# Patient Record
Sex: MALE | Race: BLACK OR AFRICAN AMERICAN | Employment: FULL TIME | URBAN - METROPOLITAN AREA
[De-identification: names, ages, dates, MRNs, and addresses within clinical notes are randomized per-mention and may not be internally consistent; named-entity substitution may affect disease eponyms.]

---

## 2019-04-27 ENCOUNTER — ED HISTORICAL/CONVERTED ENCOUNTER (OUTPATIENT)
Dept: OTHER | Age: 24
End: 2019-04-27

## 2019-05-14 ENCOUNTER — ED HISTORICAL/CONVERTED ENCOUNTER (OUTPATIENT)
Dept: OTHER | Age: 24
End: 2019-05-14

## 2019-11-03 ENCOUNTER — ED HISTORICAL/CONVERTED ENCOUNTER (OUTPATIENT)
Dept: OTHER | Age: 24
End: 2019-11-03

## 2019-12-03 ENCOUNTER — ED HISTORICAL/CONVERTED ENCOUNTER (OUTPATIENT)
Dept: OTHER | Age: 24
End: 2019-12-03

## 2020-03-20 ENCOUNTER — ED HISTORICAL/CONVERTED ENCOUNTER (OUTPATIENT)
Dept: OTHER | Age: 25
End: 2020-03-20

## 2020-04-03 ENCOUNTER — ED HISTORICAL/CONVERTED ENCOUNTER (OUTPATIENT)
Dept: OTHER | Age: 25
End: 2020-04-03

## 2020-05-21 ENCOUNTER — ED HISTORICAL/CONVERTED ENCOUNTER (OUTPATIENT)
Dept: OTHER | Age: 25
End: 2020-05-21

## 2020-07-07 ENCOUNTER — ED HISTORICAL/CONVERTED ENCOUNTER (OUTPATIENT)
Dept: OTHER | Age: 25
End: 2020-07-07

## 2020-07-31 ENCOUNTER — ED HISTORICAL/CONVERTED ENCOUNTER (OUTPATIENT)
Dept: OTHER | Age: 25
End: 2020-07-31

## 2020-12-31 ENCOUNTER — APPOINTMENT (OUTPATIENT)
Dept: GENERAL RADIOLOGY | Age: 25
End: 2020-12-31
Attending: EMERGENCY MEDICINE
Payer: MEDICAID

## 2020-12-31 ENCOUNTER — APPOINTMENT (OUTPATIENT)
Dept: CT IMAGING | Age: 25
End: 2020-12-31
Attending: EMERGENCY MEDICINE
Payer: MEDICAID

## 2020-12-31 ENCOUNTER — HOSPITAL ENCOUNTER (EMERGENCY)
Age: 25
Discharge: HOME | End: 2020-12-31
Payer: MEDICAID

## 2020-12-31 VITALS
RESPIRATION RATE: 16 BRPM | OXYGEN SATURATION: 99 % | SYSTOLIC BLOOD PRESSURE: 118 MMHG | DIASTOLIC BLOOD PRESSURE: 57 MMHG | TEMPERATURE: 97.2 F | WEIGHT: 196 LBS | HEIGHT: 62 IN | BODY MASS INDEX: 36.07 KG/M2 | HEART RATE: 82 BPM

## 2020-12-31 DIAGNOSIS — R55 VASOVAGAL EPISODE: Primary | ICD-10-CM

## 2020-12-31 LAB
ALBUMIN SERPL-MCNC: 3.2 G/DL (ref 3.5–5)
ALBUMIN/GLOB SERPL: 0.7 {RATIO} (ref 1.1–2.2)
ALP SERPL-CCNC: 83 U/L (ref 45–117)
ALT SERPL-CCNC: 40 U/L (ref 12–78)
AMMONIA PLAS-SCNC: 31 UMOL/L
AMPHET UR QL SCN: NEGATIVE
ANION GAP SERPL CALC-SCNC: 5 MMOL/L (ref 5–15)
APPEARANCE UR: CLEAR
AST SERPL W P-5'-P-CCNC: 24 U/L (ref 15–37)
ATRIAL RATE: 89 BPM
BACTERIA URNS QL MICRO: NEGATIVE /HPF
BARBITURATES UR QL SCN: NEGATIVE
BASOPHILS # BLD: 0.1 K/UL (ref 0–0.1)
BASOPHILS NFR BLD: 1 % (ref 0–1)
BENZODIAZ UR QL: NEGATIVE
BILIRUB SERPL-MCNC: 0.3 MG/DL (ref 0.2–1)
BILIRUB UR QL: NEGATIVE
BUN SERPL-MCNC: 12 MG/DL (ref 6–20)
BUN/CREAT SERPL: 15 (ref 12–20)
CA-I BLD-MCNC: 8.4 MG/DL (ref 8.5–10.1)
CALCULATED P AXIS, ECG09: 46 DEGREES
CALCULATED R AXIS, ECG10: 46 DEGREES
CALCULATED T AXIS, ECG11: 33 DEGREES
CANNABINOIDS UR QL SCN: NEGATIVE
CHLORIDE SERPL-SCNC: 107 MMOL/L (ref 97–108)
CO2 SERPL-SCNC: 26 MMOL/L (ref 21–32)
COCAINE UR QL SCN: NEGATIVE
COLOR UR: NORMAL
CREAT SERPL-MCNC: 0.8 MG/DL (ref 0.7–1.3)
DIAGNOSIS, 93000: NORMAL
DIFFERENTIAL METHOD BLD: ABNORMAL
DRUG SCRN COMMENT,DRGCM: NORMAL
EOSINOPHIL # BLD: 0.2 K/UL (ref 0–0.4)
EOSINOPHIL NFR BLD: 3 % (ref 0–7)
ERYTHROCYTE [DISTWIDTH] IN BLOOD BY AUTOMATED COUNT: 14.7 % (ref 11.5–14.5)
GLOBULIN SER CALC-MCNC: 4.5 G/DL (ref 2–4)
GLUCOSE SERPL-MCNC: 106 MG/DL (ref 65–100)
GLUCOSE UR STRIP.AUTO-MCNC: NEGATIVE MG/DL
HCT VFR BLD AUTO: 38.5 % (ref 36.6–50.3)
HGB BLD-MCNC: 12.5 G/DL (ref 12.1–17)
HGB UR QL STRIP: NEGATIVE
IMM GRANULOCYTES # BLD AUTO: 0 K/UL (ref 0–0.04)
IMM GRANULOCYTES NFR BLD AUTO: 0 % (ref 0–0.5)
KETONES UR QL STRIP.AUTO: NEGATIVE MG/DL
LEUKOCYTE ESTERASE UR QL STRIP.AUTO: NEGATIVE
LIPASE SERPL-CCNC: 92 U/L (ref 73–393)
LYMPHOCYTES # BLD: 2.2 K/UL (ref 0.8–3.5)
LYMPHOCYTES NFR BLD: 35 % (ref 12–49)
MCH RBC QN AUTO: 27.1 PG (ref 26–34)
MCHC RBC AUTO-ENTMCNC: 32.5 G/DL (ref 30–36.5)
MCV RBC AUTO: 83.3 FL (ref 80–99)
METHADONE UR QL: NEGATIVE
MONOCYTES # BLD: 0.5 K/UL (ref 0–1)
MONOCYTES NFR BLD: 8 % (ref 5–13)
MUCOUS THREADS URNS QL MICRO: NORMAL /LPF
NEUTS SEG # BLD: 3.4 K/UL (ref 1.8–8)
NEUTS SEG NFR BLD: 53 % (ref 32–75)
NITRITE UR QL STRIP.AUTO: NEGATIVE
OPIATES UR QL: NEGATIVE
P-R INTERVAL, ECG05: 192 MS
PCP UR QL: NEGATIVE
PH UR STRIP: 7 [PH] (ref 5–8)
PLATELET # BLD AUTO: 333 K/UL (ref 150–400)
PMV BLD AUTO: 9.1 FL (ref 8.9–12.9)
POTASSIUM SERPL-SCNC: 3.2 MMOL/L (ref 3.5–5.1)
PROT SERPL-MCNC: 7.7 G/DL (ref 6.4–8.2)
PROT UR STRIP-MCNC: NEGATIVE MG/DL
Q-T INTERVAL, ECG07: 342 MS
QRS DURATION, ECG06: 86 MS
QTC CALCULATION (BEZET), ECG08: 416 MS
RBC # BLD AUTO: 4.62 M/UL (ref 4.1–5.7)
RBC #/AREA URNS HPF: NORMAL /HPF (ref 0–5)
SODIUM SERPL-SCNC: 138 MMOL/L (ref 136–145)
SP GR UR REFRACTOMETRY: 1.01 (ref 1–1.03)
UA: UC IF INDICATED,UAUC: NORMAL
UROBILINOGEN UR QL STRIP.AUTO: 0.1 EU/DL (ref 0.1–1)
VENTRICULAR RATE, ECG03: 89 BPM
WBC # BLD AUTO: 6.4 K/UL (ref 4.1–11.1)
WBC URNS QL MICRO: NORMAL /HPF (ref 0–4)

## 2020-12-31 PROCEDURE — 74018 RADEX ABDOMEN 1 VIEW: CPT

## 2020-12-31 PROCEDURE — 36415 COLL VENOUS BLD VENIPUNCTURE: CPT

## 2020-12-31 PROCEDURE — 99285 EMERGENCY DEPT VISIT HI MDM: CPT

## 2020-12-31 PROCEDURE — 80307 DRUG TEST PRSMV CHEM ANLYZR: CPT

## 2020-12-31 PROCEDURE — 70450 CT HEAD/BRAIN W/O DYE: CPT

## 2020-12-31 PROCEDURE — 85025 COMPLETE CBC W/AUTO DIFF WBC: CPT

## 2020-12-31 PROCEDURE — 80053 COMPREHEN METABOLIC PANEL: CPT

## 2020-12-31 PROCEDURE — 81001 URINALYSIS AUTO W/SCOPE: CPT

## 2020-12-31 PROCEDURE — 83690 ASSAY OF LIPASE: CPT

## 2020-12-31 PROCEDURE — 82140 ASSAY OF AMMONIA: CPT

## 2020-12-31 PROCEDURE — 93005 ELECTROCARDIOGRAM TRACING: CPT

## 2020-12-31 NOTE — ED PROVIDER NOTES
EMERGENCY DEPARTMENT HISTORY AND PHYSICAL EXAM      Date: 12/31/2020  Patient Name: Torri Peng    History of Presenting Illness     Chief Complaint   Patient presents with    Abdominal Pain    Headache       History Provided By: Patient    HPI: Torri Peng, 26 y.o. male with a past medical history significant No significant past medical history presents to the ED with cc of abdominal pain. Patient was at work when he experienced sudden abdominal pain described as cramping in nature. Patient then sat down on the ground. A rapid response was called and patient was brought to the ER for evaluation. Patient specifically denies fever chills, chest pain, shortness of breath, nausea, vomiting, diarrhea. Moderate severity, no known exacerbating or relieving factors, no other associated signs and symptoms    There are no other complaints, changes, or physical findings at this time. PCP: Chaitanya Mancilla MD    No current facility-administered medications on file prior to encounter. No current outpatient medications on file prior to encounter. Past History     Past Medical History:  History reviewed. No pertinent past medical history. Past Surgical History:  History reviewed. No pertinent surgical history. Family History:  History reviewed. No pertinent family history. Social History:  Social History     Tobacco Use    Smoking status: Never Smoker    Smokeless tobacco: Never Used   Substance Use Topics    Alcohol use: Never     Frequency: Never    Drug use: Never       Allergies:  No Known Allergies      Review of Systems     Review of Systems   Constitutional: Negative for chills and fever. HENT: Negative for dental problem and sore throat. Eyes: Negative for pain and visual disturbance. Respiratory: Negative for cough and chest tightness. Cardiovascular: Negative for chest pain. Gastrointestinal: Positive for abdominal pain. Negative for diarrhea and nausea.    Genitourinary: Negative for difficulty urinating and frequency. Musculoskeletal: Negative for gait problem and joint swelling. Neurological: Positive for headaches. Negative for numbness. Hematological: Negative for adenopathy. Does not bruise/bleed easily. Psychiatric/Behavioral: Negative for behavioral problems and suicidal ideas. Physical Exam     Physical Exam  Constitutional:       General: He is not in acute distress. Appearance: Normal appearance. He is not ill-appearing or toxic-appearing. HENT:      Head: Normocephalic and atraumatic. Nose: Nose normal.      Mouth/Throat:      Mouth: Mucous membranes are moist.   Eyes:      Extraocular Movements: Extraocular movements intact. Pupils: Pupils are equal, round, and reactive to light. Neck:      Musculoskeletal: Normal range of motion and neck supple. Cardiovascular:      Rate and Rhythm: Normal rate and regular rhythm. Pulmonary:      Effort: Pulmonary effort is normal.      Breath sounds: Normal breath sounds. Abdominal:      General: Bowel sounds are normal.   Musculoskeletal: Normal range of motion. Skin:     General: Skin is warm and dry. Capillary Refill: Capillary refill takes less than 2 seconds. Neurological:      General: No focal deficit present. Mental Status: He is alert and oriented to person, place, and time. Psychiatric:         Mood and Affect: Mood normal.         Behavior: Behavior normal.         Lab and Diagnostic Study Results     Labs -     No results found for this or any previous visit (from the past 12 hour(s)). Radiologic Studies -   @lastxrresult@  CT Results  (Last 48 hours)    None        CXR Results  (Last 48 hours)    None            Medical Decision Making   - I am the first provider for this patient. - I reviewed the vital signs, available nursing notes, past medical history, past surgical history, family history and social history. - Initial assessment performed.  The patients presenting problems have been discussed, and they are in agreement with the care plan formulated and outlined with them. I have encouraged them to ask questions as they arise throughout their visit. Vital Signs-Reviewed the patient's vital signs. No data found. Records Reviewed: Nursing Notes and Old Medical Records          ED Course:          Provider Notes (Medical Decision Making):   Pt presents with acute abdominal pain; vital signs stable with currently a non-peritoneal exam; DDx includes: Gastroenteritis, hepatitis, pancreatitis, obstruction, appendicitis, viral illness, IBD, diverticulitis, mesenteric ischemia, AAA or descending dissection, ACS, kidney stone. Will get labs, treat symptomatically and obtain serial abdominal exams to determine if additional imaging is indicated. Will reassess and monitor closely. MDM       Procedures   Medical Decision Makingedical Decision Making  Performed by: Braulio Hardy NP  PROCEDURES:  Procedures       Disposition   Disposition: DC- Adult Discharges: All of the diagnostic tests were reviewed and questions answered. Diagnosis, care plan and treatment options were discussed. The patient understands the instructions and will follow up as directed. The patients results have been reviewed with them. They have been counseled regarding their diagnosis. The patient verbally convey understanding and agreement of the signs, symptoms, diagnosis, treatment and prognosis and additionally agrees to follow up as recommended with their PCP in 24 - 48 hours. They also agree with the care-plan and convey that all of their questions have been answered.   I have also put together some discharge instructions for them that include: 1) educational information regarding their diagnosis, 2) how to care for their diagnosis at home, as well a 3) list of reasons why they would want to return to the ED prior to their follow-up appointment, should their condition change. Discharged    DISCHARGE PLAN:  1. There are no discharge medications for this patient. 2.   Follow-up Information     Follow up With Specialties Details Why Contact Info    Dimitry Deleon MD Internal Medicine   [facility] [address]  [facility]  [phone]          3. Return to ED if worse   4. There are no discharge medications for this patient. Diagnosis     Clinical Impression:   1. Vasovagal episode        Attestations:    Yesenia Mariano NP    Please note that this dictation was completed with [employer], the computer voice recognition software. Quite often unanticipated grammatical, syntax, homophones, and other interpretive errors are inadvertently transcribed by the computer software. Please disregard these errors. Please excuse any errors that have escaped final proofreading. Thank you.

## 2020-12-31 NOTE — ED TRIAGE NOTES
Rapid response called to dietary. Pt came to work complaining of a HA and tooth ache. Pt then started complaining LLQ pain and laid on the floor in dietary.

## 2020-12-31 NOTE — LETTER
[address] [place] [address] EMERGENCY DEPT 
[facility] [address] [place] [address] 
[phone] Work/School Note Date: 12/31/2020 To Whom It May concern: 
 
Ana Jaimes was seen and treated today in the emergency room by the following provider(s): 
Nurse Practitioner: Crystal Petty NP. Ana Jaimes may return to work on 1/2/2021.  
 
Sincerely, 
 
 
 
 
Neville Galeazzi RN/ Camelia LARRY

## 2020-12-31 NOTE — LETTER
[address] [place] [address] EMERGENCY DEPT 
[facility] [address] [place] [address] 
[phone] Work/School Note Date: 12/31/2020 To Whom It May concern: 
 
Macario Saldana was seen and treated today in the emergency room by the following provider(s): 
Nurse Practitioner: Deejay Murray NP. Macario Saldana is excused from work/school on 12/31/20 and 01/01/21. He is medically clear to return to work/school on 1/2/2021. Sincerely, 
 
 
 
 
Marylene Pinks

## 2020-12-31 NOTE — ED NOTES
[facility] all follow up appointments explained with pt's verbal understanding given. All personal belongings taken.

## 2021-02-01 ENCOUNTER — HOSPITAL ENCOUNTER (EMERGENCY)
Age: 26
Discharge: HOME | End: 2021-02-01
Payer: MEDICAID

## 2021-02-01 VITALS
BODY MASS INDEX: 32.76 KG/M2 | RESPIRATION RATE: 16 BRPM | HEIGHT: 62 IN | WEIGHT: 178 LBS | OXYGEN SATURATION: 98 % | TEMPERATURE: 98.1 F | HEART RATE: 84 BPM | SYSTOLIC BLOOD PRESSURE: 100 MMHG | DIASTOLIC BLOOD PRESSURE: 62 MMHG

## 2021-02-01 DIAGNOSIS — L02.412 ABSCESS OF LEFT AXILLA: ICD-10-CM

## 2021-02-01 DIAGNOSIS — L73.2 AXILLARY HIDRADENITIS SUPPURATIVA: Primary | ICD-10-CM

## 2021-02-01 PROCEDURE — 99282 EMERGENCY DEPT VISIT SF MDM: CPT

## 2021-02-01 PROCEDURE — 74011000250 HC RX REV CODE- 250: Performed by: PHYSICIAN ASSISTANT

## 2021-02-01 PROCEDURE — 75810000289 HC I&D ABSCESS SIMP/COMP/MULT

## 2021-02-01 RX ORDER — LIDOCAINE HYDROCHLORIDE 10 MG/ML
20 INJECTION INFILTRATION; PERINEURAL ONCE
Status: COMPLETED | OUTPATIENT
Start: 2021-02-01 | End: 2021-02-01

## 2021-02-01 RX ORDER — CEPHALEXIN 500 MG/1
500 CAPSULE ORAL 3 TIMES DAILY
Qty: 21 CAP | Refills: 0 | Status: SHIPPED | OUTPATIENT
Start: 2021-02-01 | End: 2021-02-08

## 2021-02-01 RX ORDER — SULFAMETHOXAZOLE AND TRIMETHOPRIM 800; 160 MG/1; MG/1
1 TABLET ORAL 2 TIMES DAILY
Qty: 14 TAB | Refills: 0 | Status: SHIPPED | OUTPATIENT
Start: 2021-02-01 | End: 2021-02-08

## 2021-02-01 RX ADMIN — LIDOCAINE HYDROCHLORIDE 20 ML: 10 INJECTION, SOLUTION INFILTRATION; PERINEURAL at 16:43

## 2021-02-01 NOTE — ED PROVIDER NOTES
EMERGENCY DEPARTMENT HISTORY AND PHYSICAL EXAM      Date: 2/1/2021  Patient Name: Todd Banks    History of Presenting Illness     Chief Complaint   Patient presents with    Abscess       History Provided By: Patient    HPI: Todd Banks, 26 y.o. male with a past medical history significant recurrent abscess presents to the ED with cc of bilateral axillary pain secondary to abscess. Patient reports recurrence of abscess to his bilateral axilla, worse over the last few days. He denies fever, chills, body aches. He had a virtual visit with a dermatologist yesterday who told him that he needed the abscesses drained. He has never seen a general surgeon for his complaints. He has not been on antibiotic therapy in over 2 months. Active drainage noted. He has been using warm compresses at home. He specifically denies any other symptoms or complaints at this time. There are no other complaints, changes, or physical findings at this time. PCP: Alma Jones MD    No current facility-administered medications on file prior to encounter. No current outpatient medications on file prior to encounter. Past History     Past Medical History:  No past medical history on file. Past Surgical History:  No past surgical history on file. Family History:  Family History   Problem Relation Age of Onset    Hypertension Mother     Hypertension Father     Diabetes Maternal Aunt     Stroke Paternal Grandmother        Social History:  Social History     Tobacco Use    Smoking status: Never Smoker    Smokeless tobacco: Never Used   Substance Use Topics    Alcohol use: Never     Frequency: Never    Drug use: Never       Allergies:  No Known Allergies      Review of Systems   Review of Systems   Constitutional: Negative for activity change, chills and fever. HENT: Negative for congestion, ear pain, rhinorrhea and trouble swallowing. Eyes: Negative for pain and visual disturbance.    Respiratory: Negative for cough and shortness of breath. Cardiovascular: Negative for chest pain. Gastrointestinal: Negative for abdominal pain, diarrhea, nausea and vomiting. Genitourinary: Negative for decreased urine volume, difficulty urinating, dysuria and hematuria. Musculoskeletal: Positive for myalgias. Negative for arthralgias. Skin: Positive for wound. Negative for rash. Neurological: Negative for weakness and headaches. Hematological: Negative for adenopathy. Psychiatric/Behavioral: The patient is not nervous/anxious. All other systems reviewed and are negative. Physical Exam   Physical Exam  Vitals signs and nursing note reviewed. Constitutional:       General: He is not in acute distress. Appearance: He is well-developed. He is not ill-appearing. HENT:      Head: Normocephalic and atraumatic. Mouth/Throat:      Mouth: Mucous membranes are moist.   Eyes:      Extraocular Movements: Extraocular movements intact. Pupils: Pupils are equal, round, and reactive to light. Cardiovascular:      Rate and Rhythm: Normal rate. Pulmonary:      Effort: Pulmonary effort is normal. No respiratory distress. Skin:     General: Skin is warm and dry. Capillary Refill: Capillary refill takes less than 2 seconds. Findings: Abscess present. No rash. Comments: Bilateral axilla: Multiple sites of fluctuance and induration notable scars from previous I&D, no streaking redness, moderate tenderness to palpation, left axilla currently with copious amounts of purulent drainage noted   Neurological:      General: No focal deficit present. Mental Status: He is alert. Cranial Nerves: No cranial nerve deficit. Psychiatric:         Mood and Affect: Mood normal.         Behavior: Behavior normal.         Diagnostic Study Results     Labs -   No results found for this or any previous visit (from the past 48 hour(s)).     Radiologic Studies -   Results from [facility] encounter on 12/31/20   XR ABD (KUB)    Narrative Abdominal pain. FINDINGS: Frontal view of the abdomen. No gas dilated loops of bowel. Paucity of  bowel gas. No evident organomegaly or abnormal soft tissue calcification. Bony  structures grossly intact. Included lung bases are clear. Impression IMPRESSION: No gaseous dilated loops of bowel. Paucity of bowel gas. CT Results  (Last 48 hours)    None          Medical Decision Making   I am the first provider for this patient. I reviewed the vital signs, available nursing notes, past medical history, past surgical history, family history and social history. Vital Signs-Reviewed the patient's vital signs. Wt Readings from Last 3 Encounters:   02/03/21 97.5 kg (215 lb)   02/01/21 80.7 kg (178 lb)   12/31/20 88.9 kg (196 lb)     Temp Readings from Last 3 Encounters:   02/03/21 97.5 °F (36.4 °C) (Temporal)   02/01/21 98.1 °F (36.7 °C)   12/31/20 97.2 °F (36.2 °C)     BP Readings from Last 3 Encounters:   02/03/21 117/70   02/01/21 100/62   12/31/20 (!) 118/57     Pulse Readings from Last 3 Encounters:   02/03/21 97   02/01/21 84   12/31/20 82       No data found. Records Reviewed: Nursing Notes    Provider Notes (Medical Decision Making):     MDM  Number of Diagnoses or Management Options  Abscess of left axilla  Axillary hidradenitis suppurativa  Diagnosis management comments: Patient presented with bilateral axillary abscess. Clinical signs and symptoms consistent with hidradenitis suppurativa. We placed on antibiotic therapy with Bactrim and Keflex. I have advised him to see a general surgeon for further evaluation and treatment management. He agrees with plan. No signs of sepsis. ED Course:   Initial assessment performed. The patients presenting problems have been discussed, and they are in agreement with the care plan formulated and outlined with them. I have encouraged them to ask questions as they arise throughout their visit. PROCEDURES    I&D Abcess Simple    Date/Time: 2/1/2021 6:39 PM  Performed by: Ximena Hernández PA-C  Authorized by: Ximena Hernández PA-C     Consent:     Consent obtained:  Verbal    Consent given by:  Patient    Risks discussed:  Bleeding, pain and incomplete drainage    Alternatives discussed:  Alternative treatment and referral  Location:     Type:  Abscess    Size:  3x3cm    Location: left axilla. Pre-procedure details:     Skin preparation:  Betadine  Anesthesia (see MAR for exact dosages): Anesthesia method:  Local infiltration    Local anesthetic:  Lidocaine 1% w/o epi  Procedure type:     Complexity:  Complex  Procedure details:     Needle aspiration: no      Incision types:  Stab incision    Incision depth:  Dermal    Scalpel blade:  11    Wound management:  Probed and deloculated and irrigated with saline    Drainage:  Bloody    Drainage amount:  Scant    Wound treatment:  Wound left open    Packing materials:  None  Post-procedure details:     Patient tolerance of procedure: Tolerated well, no immediate complications         DISPOSITION    Discharged    PLAN:  1. Discharge Medication List as of 2/1/2021  6:58 PM      START taking these medications    Details   trimethoprim-sulfamethoxazole (Bactrim DS) 160-800 mg per tablet Take 1 Tab by mouth two (2) times a day for 7 days. , Normal, Disp-14 Tab, R-0      cephALEXin (Keflex) 500 mg capsule Take 1 Cap by mouth three (3) times daily for 7 days. , Normal, Disp-21 Cap, R-0            2.   Follow-up Information     Follow up With Specialties Details Why Contact Info    Vince Roque MD Colon and Rectal Surgery, General Surgery Schedule an appointment as soon as possible for a visit  for follow up from ER visit [facility] [address] [facility]  [phone]      [facility] EMERGENCY DEPT Emergency Medicine  As needed, If symptoms worsen [address] [facility] [address] [phone]        Return to ED if worse Diagnosis     Clinical Impression:   1. Axillary hidradenitis suppurativa    2.  Abscess of left axilla

## 2021-02-01 NOTE — LETTER
Srinivasa Stewart was seen and treated in our emergency department on 2/1/2021. He may return to work on 2/2/2021 If you have any questions or concerns, please don't hesitate to call.  
 
 
Nova Saul PA-C

## 2021-02-02 NOTE — ED NOTES
Pt verbalized understanding of discharge instructions. Pt aware of new medications and where to pick them up.  Pt alert and self ambulatory on discharge

## 2021-02-03 ENCOUNTER — OFFICE VISIT (OUTPATIENT)
Dept: SURGERY | Age: 26
End: 2021-02-03
Payer: MEDICAID

## 2021-02-03 VITALS
SYSTOLIC BLOOD PRESSURE: 117 MMHG | OXYGEN SATURATION: 98 % | BODY MASS INDEX: 39.56 KG/M2 | DIASTOLIC BLOOD PRESSURE: 70 MMHG | TEMPERATURE: 97.5 F | HEIGHT: 62 IN | WEIGHT: 215 LBS | HEART RATE: 97 BPM

## 2021-02-03 DIAGNOSIS — L73.2 AXILLARY HIDRADENITIS SUPPURATIVA: Primary | ICD-10-CM

## 2021-02-03 DIAGNOSIS — Z09 HOSPITAL DISCHARGE FOLLOW-UP: ICD-10-CM

## 2021-02-03 PROCEDURE — 99203 OFFICE O/P NEW LOW 30 MIN: CPT | Performed by: COLON & RECTAL SURGERY

## 2021-02-03 PROCEDURE — 1111F DSCHRG MED/CURRENT MED MERGE: CPT | Performed by: COLON & RECTAL SURGERY

## 2021-02-03 RX ORDER — DOXYCYCLINE 100 MG/1
100 CAPSULE ORAL 2 TIMES DAILY
Qty: 42 CAP | Refills: 1 | Status: SHIPPED | OUTPATIENT
Start: 2021-02-03 | End: 2021-03-17

## 2021-02-03 RX ORDER — CLINDAMYCIN PHOSPHATE 10 MG/G
GEL TOPICAL 2 TIMES DAILY
Qty: 60 G | Refills: 2 | Status: SHIPPED | OUTPATIENT
Start: 2021-02-03 | End: 2021-03-05

## 2021-02-03 NOTE — PROGRESS NOTES
OFFICE VISIT NOTE    Torri Peng is a 26 y.o. male who presents to the office today for:    Chief Complaint   Patient presents with   [facility] Follow Up     emergency room f/u- cysts under both arms/hidradenitis       The patient is a 52-year-old gentleman who is referred from the emergency department for evaluation of bilateral axillary hidradenitis. He states he has been having abscesses in both axilla for quite some time. He states he used to use deodorant but no longer does so. In the emergency department he was prescribed Bactrim and Keflex. He says he just started. He otherwise states he is in good health. He has no chronic medical health problems. He takes no medications on a daily basis. History reviewed. No pertinent past medical history. History reviewed. No pertinent surgical history.     Family History   Problem Relation Age of Onset    Hypertension Mother     Hypertension Father     Diabetes Maternal Aunt     Stroke Paternal Grandmother        Social History     Socioeconomic History    Marital status: SINGLE     Spouse name: Not on file    Number of children: Not on file    Years of education: Not on file    Highest education level: Not on file   Occupational History    Not on file   Social Needs    Financial resource strain: Not on file    Food insecurity     Worry: Not on file     Inability: Not on file   [employer] needs     Medical: Not on file     Non-medical: Not on file   Tobacco Use    Smoking status: Never Smoker    Smokeless tobacco: Never Used   Substance and Sexual Activity    Alcohol use: Never     Frequency: Never    Drug use: Never    Sexual activity: Not on file   Lifestyle    Physical activity     Days per week: Not on file     Minutes per session: Not on file    Stress: Not on file   Relationships    Social connections Talks on phone: Not on file     Gets together: Not on file     Attends [employer] service: Not on file     Active member of club or organization: Not on file     Attends meetings of clubs or organizations: Not on file     Relationship status: Not on file    Intimate partner violence     Fear of current or ex partner: Not on file     Emotionally abused: Not on file     Physically abused: Not on file     Forced sexual activity: Not on file   Other Topics Concern    Not on file   Social History Narrative    Not on file       No Known Allergies    Current Outpatient Medications   Medication Sig    trimethoprim-sulfamethoxazole (Bactrim DS) 160-800 mg per tablet Take 1 Tab by mouth two (2) times a day for 7 days.  cephALEXin (Keflex) 500 mg capsule Take 1 Cap by mouth three (3) times daily for 7 days. No current facility-administered medications for this visit. Review of Systems   All other systems reviewed and are negative. /70 (BP 1 Location: Left upper arm, BP Patient Position: Sitting)   Pulse 97   Temp 97.5 °F (36.4 °C) (Temporal)   Ht 5' 2\" (1.575 m)   Wt 215 lb (97.5 kg)   SpO2 98%   BMI 39.32 kg/m²     Physical Exam  Constitutional:       Appearance: Normal appearance. He is obese. HENT:      Head: Normocephalic and atraumatic. Neck:      Musculoskeletal: Normal range of motion and neck supple. Cardiovascular:      Rate and Rhythm: Normal rate and regular rhythm. Pulmonary:      Effort: Pulmonary effort is normal.      Breath sounds: Normal breath sounds. Abdominal:      General: There is no distension. Palpations: Abdomen is soft. Tenderness: There is no abdominal tenderness. Musculoskeletal: Normal range of motion. Skin:     General: Skin is warm and dry.       Comments: Bilateral axilla with multiple small draining abscesses and sinus tracts consistent with hidradenitis suppurativa; no undrained collections appreciated   Neurological:      General: No focal deficit present. Mental Status: He is alert. Psychiatric:         Mood and Affect: Mood normal.         Thought Content: Thought content normal.         Judgment: Judgment normal.         Problem List Items Addressed This Visit        Integumentary    Axillary hidradenitis suppurativa - Primary          Assessment and Plan:  I told Mr. Marshall Mason that I like him to get an antibacterial cleansing agent such as Hibiclens to use twice a day. In addition I am going to prescribe doxycycline for 6-week total course. I am also going to have him do Clindagel topically. He will come back and see me in 3 weeks. Once things are under control we can talk about elective surgery for his hidradenitis.             Kb Gan MD

## 2021-03-23 ENCOUNTER — OFFICE VISIT (OUTPATIENT)
Dept: SURGERY | Age: 26
End: 2021-03-23
Payer: MEDICAID

## 2021-03-23 VITALS
OXYGEN SATURATION: 98 % | TEMPERATURE: 97.7 F | DIASTOLIC BLOOD PRESSURE: 71 MMHG | SYSTOLIC BLOOD PRESSURE: 103 MMHG | WEIGHT: 211.4 LBS | HEIGHT: 62 IN | BODY MASS INDEX: 38.9 KG/M2 | HEART RATE: 76 BPM | RESPIRATION RATE: 16 BRPM

## 2021-03-23 DIAGNOSIS — L73.2 AXILLARY HIDRADENITIS SUPPURATIVA: Primary | ICD-10-CM

## 2021-03-23 PROCEDURE — 99214 OFFICE O/P EST MOD 30 MIN: CPT | Performed by: COLON & RECTAL SURGERY

## 2021-03-23 NOTE — PROGRESS NOTES
Chief Complaint   Patient presents with    Follow-up     cyst under bilateral axilla     Visit Vitals  /71 (BP 1 Location: Left upper arm, BP Patient Position: Sitting, BP Cuff Size: Adult)   Pulse 76   Temp 97.7 °F (36.5 °C) (Temporal)   Resp 16   Ht 5' 2\" (1.575 m)   Wt 211 lb 6.4 oz (95.9 kg)   SpO2 98%   BMI 38.67 kg/m²

## 2021-03-25 NOTE — PROGRESS NOTES
OFFICE VISIT NOTE    Sim Geller is a 26 y.o. male who presents to the office today for:    Chief Complaint   Patient presents with    Follow-up     cyst under bilateral axilla       Mr. Michael Barajas comes in today for follow-up of his axillary hidradenitis suppurativa. She saw him for this back at the beginning of February. At that time he had multiple abscesses in bilateral axilla with drainage. I prescribed doxycycline 1 tablet twice a day. I also prescribed Clindagel. He has completed his course of doxycycline now. He states that he also use the Clindagel. He states he continues have drainage. He also states he has pain in both axilla. He denies using any antiperspirants. He otherwise is healthy. He has no chronic medical conditions. He takes no medications on a daily basis. History reviewed. No pertinent past medical history. History reviewed. No pertinent surgical history.     Family History   Problem Relation Age of Onset    Hypertension Mother     Hypertension Father     Diabetes Maternal Aunt     Stroke Paternal Grandmother        Social History     Socioeconomic History    Marital status: SINGLE     Spouse name: Not on file    Number of children: Not on file    Years of education: Not on file    Highest education level: Not on file   Occupational History    Not on file   Social Needs    Financial resource strain: Not on file    Food insecurity     Worry: Not on file     Inability: Not on file   [employer] needs     Medical: Not on file     Non-medical: Not on file   Tobacco Use    Smoking status: Never Smoker    Smokeless tobacco: Never Used   Substance and Sexual Activity    Alcohol use: Never     Frequency: Never    Drug use: Never    Sexual activity: Not on file   Lifestyle    Physical activity     Days per week: Not on file     Minutes per session: Not on file    Stress: Not on file   Relationships    Social connections     Talks on phone: Not on file     Gets together: Not on file     Attends Buddhist service: Not on file     Active member of club or organization: Not on file     Attends meetings of clubs or organizations: Not on file     Relationship status: Not on file    Intimate partner violence     Fear of current or ex partner: Not on file     Emotionally abused: Not on file     Physically abused: Not on file     Forced sexual activity: Not on file   Other Topics Concern    Not on file   Social History Narrative    Not on file       No Known Allergies    No current outpatient medications on file. No current facility-administered medications for this visit. Review of Systems   All other systems reviewed and are negative. /71 (BP 1 Location: Left upper arm, BP Patient Position: Sitting, BP Cuff Size: Adult)   Pulse 76   Temp 97.7 °F (36.5 °C) (Temporal)   Resp 16   Ht 5' 2\" (1.575 m)   Wt 211 lb 6.4 oz (95.9 kg)   SpO2 98%   BMI 38.67 kg/m²     Physical Exam  Constitutional:       Appearance: Normal appearance. He is obese. HENT:      Head: Normocephalic and atraumatic. Neck:      Musculoskeletal: Normal range of motion and neck supple. Cardiovascular:      Rate and Rhythm: Normal rate and regular rhythm. Pulmonary:      Effort: Pulmonary effort is normal.      Breath sounds: Normal breath sounds. Abdominal:      General: There is no distension. Palpations: Abdomen is soft. Tenderness: There is no abdominal tenderness. Musculoskeletal: Normal range of motion. Skin:     General: Skin is warm and dry. Comments: Continues to have drainage from sinus tracts in bilateral axilla although the induration from his prior exam is improved. There is no undrained collections. Neurological:      General: No focal deficit present. Mental Status: He is alert.    Psychiatric:         Mood and Affect: Mood normal.         Thought Content: Thought content normal.         Judgment: Judgment normal.         Problem List Items Addressed This Visit        Integumentary    Axillary hidradenitis suppurativa - Primary          Assessment and Plan:    I discussed surgical management of his axillary hidradenitis with him. I explained this will entail removing all the hairbearing skin in the axilla. I reviewed the risk and benefits and the risk of infection, bleeding, wound complications, the possibly having an open wound that have to heal by secondary intent depending on the extent of the excision, recurrence. Mr. Melody Chinchilla wishes to proceed and I told him we can start with the left axilla. He has been scheduled for excision of left axillary hidradenitis on April 2, 2021 at [facility].           Earnest Hsu MD

## 2021-03-26 ENCOUNTER — TELEPHONE (OUTPATIENT)
Dept: SURGERY | Age: 26
End: 2021-03-26

## 2021-03-26 NOTE — TELEPHONE ENCOUNTER
[facility] called stating the patient is supposed to have his COVID test done on Monday 3/29/2021, but they are unable to get in contact with him. They stated the [phone] and [phone] numbers are not in service. The [phone] was the only number they got an answer on and it was his mom who stated the patient would be home later - [facility] states the voicemail is not set up on that number. They tried calling the emergency contact in the chart who is his cousin and he states he doesn't know the patient's cell phone number.

## 2021-03-29 ENCOUNTER — TELEPHONE (OUTPATIENT)
Dept: SURGERY | Age: 26
End: 2021-03-29

## 2021-03-29 NOTE — TELEPHONE ENCOUNTER
PAT called regarding the pt no show for his PAT today. I tried calling all numbers listed; [phone], no longer in service. [phone]>BWTX box full unable to accept any messages;  [phone]> states no longer in service as well. Dr. Christo Salinas notified by text to phone. Thank you.

## 2021-04-02 ENCOUNTER — TELEPHONE (OUTPATIENT)
Dept: SURGERY | Age: 26
End: 2021-04-02

## 2021-04-02 NOTE — TELEPHONE ENCOUNTER
Mr. Nieves Collazo called this morning saying is was supposed to have surgery with Dr. Mae Armendariz this morning but it was canceled. He is wanting to know what happened. Thanks. Phone [phone].

## 2021-05-05 NOTE — TELEPHONE ENCOUNTER
Called pt to scheduled surgery-pt phone numbers out of order. [phone]-IWMZ message for pt to call and reschedule.

## 2021-05-27 ENCOUNTER — TELEPHONE (OUTPATIENT)
Dept: SURGERY | Age: 26
End: 2021-05-27

## 2021-05-27 NOTE — TELEPHONE ENCOUNTER
Patient called stated would like to reschedule his surgery from 04/02/2021 with Dr Melissa Sebastian.  Phone for patient  #[phone]

## 2021-06-23 ENCOUNTER — TELEPHONE (OUTPATIENT)
Dept: SURGERY | Age: 26
End: 2021-06-23

## 2021-06-23 NOTE — TELEPHONE ENCOUNTER
Patient called stated was scheduled for surgery on 04/02/2021 and cancelled. Patient stated would like to have the procedure rescheduled.  Please call patient at [phone]

## 2021-06-25 NOTE — TELEPHONE ENCOUNTER
Returned pt call regarding rescheduling surgery. N/A. Left message to have dates in mind that are good for him. Tuesdays and fridays are Dr. Evans Sites days. Also left pt office number. Thank you.

## 2021-07-05 ENCOUNTER — HOSPITAL ENCOUNTER (EMERGENCY)
Age: 26
Discharge: HOME | End: 2021-07-05
Attending: FAMILY MEDICINE
Payer: MEDICAID

## 2021-07-05 VITALS
DIASTOLIC BLOOD PRESSURE: 62 MMHG | RESPIRATION RATE: 16 BRPM | HEIGHT: 62 IN | SYSTOLIC BLOOD PRESSURE: 99 MMHG | WEIGHT: 202 LBS | HEART RATE: 85 BPM | OXYGEN SATURATION: 98 % | BODY MASS INDEX: 37.17 KG/M2 | TEMPERATURE: 98.2 F

## 2021-07-05 DIAGNOSIS — L73.2 AXILLARY HIDRADENITIS SUPPURATIVA: Primary | ICD-10-CM

## 2021-07-05 PROCEDURE — 99282 EMERGENCY DEPT VISIT SF MDM: CPT

## 2021-07-05 NOTE — ED TRIAGE NOTES
Pt c/o exacerbation of chronic painful bumps in bilateral axilla. States had to cancel surgery for same several mos ago; \"couldn't get the time off from work\". Also notes that \"no antibiotics work, I just need surgery for them\".

## 2021-07-05 NOTE — ED PROVIDER NOTES
EMERGENCY DEPARTMENT HISTORY AND PHYSICAL EXAM      Date: 7/5/2021  Patient Name: Cindy Tristan    History of Presenting Illness     Chief Complaint   Patient presents with    Skin Problem       History Provided By: Patient    HPI: Cindy Tristan, 26 y.o. male presents to the ED with cc of abscess under both armpits. He has had this for several months and has been treated with several different antibiotics and was scheduled for surgery was canceled because of work. He comes in today for referral to another surgeon because he tried to reschedule but there is no but no answer. No new symptoms develop. There are no other complaints, changes, or physical findings at this time. PCP: Erika Calderon MD    No current facility-administered medications on file prior to encounter. No current outpatient medications on file prior to encounter. Past History     Past Medical History:  Past Medical History:   Diagnosis Date    Hydradenitis        Past Surgical History:  History reviewed. No pertinent surgical history. Family History:  Family History   Problem Relation Age of Onset    Hypertension Mother     Hypertension Father     Diabetes Maternal Aunt     Stroke Paternal Grandmother        Social History:  Social History     Tobacco Use    Smoking status: Never Smoker    Smokeless tobacco: Never Used   Vaping Use    Vaping Use: Never used   Substance Use Topics    Alcohol use: Never    Drug use: Never       Allergies:  No Known Allergies      Review of Systems     Review of Systems   Constitutional: Negative for chills and fever. HENT: Negative for congestion and sore throat. Eyes: Negative for photophobia and visual disturbance. Respiratory: Negative for cough and shortness of breath. Cardiovascular: Negative for chest pain and palpitations. Gastrointestinal: Negative for nausea and vomiting. Genitourinary: Negative for dysuria and flank pain.    Musculoskeletal: Negative for arthralgias, back pain and myalgias. Skin: Positive for wound. Negative for rash. Allergic/Immunologic: Negative for environmental allergies and food allergies. Neurological: Negative for light-headedness and headaches. All other systems reviewed and are negative. Physical Exam     Physical Exam  Vitals and nursing note reviewed. Constitutional:       Appearance: Normal appearance. He is normal weight. HENT:      Head: Normocephalic and atraumatic. Eyes:      Extraocular Movements: Extraocular movements intact. Conjunctiva/sclera: Conjunctivae normal.   Cardiovascular:      Rate and Rhythm: Normal rate and regular rhythm. Pulses: Normal pulses. Heart sounds: Normal heart sounds. Pulmonary:      Effort: Pulmonary effort is normal.      Breath sounds: Normal breath sounds. Chest:      Comments: Multiple abscess of bilateral axilla. No discharge  Musculoskeletal:         General: No swelling. Normal range of motion. Skin:     General: Skin is warm. Capillary Refill: Capillary refill takes less than 2 seconds. Neurological:      General: No focal deficit present. Mental Status: He is alert and oriented to person, place, and time. Psychiatric:         Mood and Affect: Mood normal.         Behavior: Behavior normal.         Thought Content: Thought content normal.         Judgment: Judgment normal.         Lab and Diagnostic Study Results     Labs -   No results found for this or any previous visit (from the past 12 hour(s)). Radiologic Studies -   @lastxrresult@  CT Results  (Last 48 hours)    None        CXR Results  (Last 48 hours)    None            Medical Decision Making   - I am the first provider for this patient. - I reviewed the vital signs, available nursing notes, past medical history, past surgical history, family history and social history. - Initial assessment performed.  The patients presenting problems have been discussed, and they are in agreement with the care plan formulated and outlined with them. I have encouraged them to ask questions as they arise throughout their visit. Vital Signs-Reviewed the patient's vital signs. Patient Vitals for the past 12 hrs:   Temp Pulse Resp BP SpO2   07/05/21 1202 98.2 °F (36.8 °C)       07/05/21 1130  85 16 99/62 98 %       Records Reviewed: Nursing Notes    ED Course:          Provider Notes (Medical Decision Making):     MDM  Number of Diagnoses or Management Options  Risk of Complications, Morbidity, and/or Mortality  General comments: 1:56 PM  Patient is stable with no marked toxicity or distress. Provided him with name and contact information of local surgeons around the area to schedule surgical procedure with. All questions were answered and there are no apparent barriers to comprehension or communication. The patient verbalized agreement with the diagnosis, treatment plan, and understanding of the follow-up instructions. The patient is appropriate for discharge; leaves the Emergency Department walking with a stable gait. Patient understands to return to the ED in 12-24 hours for any new or worsening symptoms or no  expected timely resolution of symptoms on mediations prescribed. Disposition   Disposition: Condition stable  DC- Adult Discharges: All of the diagnostic tests were reviewed and questions answered. Diagnosis, care plan and treatment options were discussed. The patient understands the instructions and will follow up as directed. The patients results have been reviewed with them. They have been counseled regarding their diagnosis. The patient verbally convey understanding and agreement of the signs, symptoms, diagnosis, treatment and prognosis and additionally agrees to follow up as recommended with their PCP in 24 - 48 hours. They also agree with the care-plan and convey that all of their questions have been answered.   I have also put together some discharge instructions for them that include: 1) educational information regarding their diagnosis, 2) how to care for their diagnosis at home, as well a 3) list of reasons why they would want to return to the ED prior to their follow-up appointment, should their condition change. Discharged    DISCHARGE PLAN:  1. There are no discharge medications for this patient. 2.   Follow-up Information     Follow up With Specialties Details Why Contact Info    Soco Haynes MD General Surgery Schedule an appointment as soon as possible for a visit in 2 days  [facility] [address]  [address] [facility] MD Marcus General Surgery In 2 days  [address] [facility] [phone]      Quan, Rodrigue Eddy MD Surgery, General and Vascular Surgery In 2 days  [address] [facility] [phone]      Dimple Davis MD General Surgery In 2 days  [address] Gaona MD Surgery In 2 days  [address] [facility], [address] [place] [address] Surgery In 2 days  [address] [facility]  [phone]          3. Return to ED if worse   4. There are no discharge medications for this patient. Diagnosis     Clinical Impression:   1. Axillary hidradenitis suppurativa        Attestations:    Filiberto Tapia, DO    Please note that this dictation was completed with Pulse 8, the computer voice recognition software. Quite often unanticipated grammatical, syntax, homophones, and other interpretive errors are inadvertently transcribed by the computer software. Please disregard these errors. Please excuse any errors that have escaped final proofreading. Thank you.

## 2021-07-06 ENCOUNTER — TELEPHONE (OUTPATIENT)
Dept: SURGERY | Age: 26
End: 2021-07-06

## 2021-07-08 NOTE — TELEPHONE ENCOUNTER
Spoke with Dr. Florencia Adams pertaining to surgery date and Dr. Florencia Adams stated that he needs to be scheduled for an office visit since it's been over 3 months since his last visit. Pt was informed and scheduled for office visit.

## 2021-07-14 ENCOUNTER — OFFICE VISIT (OUTPATIENT)
Dept: SURGERY | Age: 26
End: 2021-07-14
Payer: MEDICAID

## 2021-07-14 VITALS
BODY MASS INDEX: 38.85 KG/M2 | DIASTOLIC BLOOD PRESSURE: 60 MMHG | SYSTOLIC BLOOD PRESSURE: 100 MMHG | RESPIRATION RATE: 18 BRPM | WEIGHT: 212.4 LBS | TEMPERATURE: 98.4 F | HEART RATE: 87 BPM | OXYGEN SATURATION: 95 %

## 2021-07-14 DIAGNOSIS — L73.2 AXILLARY HIDRADENITIS SUPPURATIVA: Primary | ICD-10-CM

## 2021-07-14 PROCEDURE — 99214 OFFICE O/P EST MOD 30 MIN: CPT | Performed by: COLON & RECTAL SURGERY

## 2021-07-14 NOTE — PROGRESS NOTES
OFFICE VISIT NOTE    Samantha Freeman is a 26 y.o. male who presents to the office today for:    Chief Complaint   Patient presents with    Follow-up     boils in [facility]    Past Medical History:   Diagnosis Date    Hydradenitis        History reviewed. No pertinent surgical history. Family History   Problem Relation Age of Onset    Hypertension Mother     Hypertension Father     Diabetes Maternal Aunt     Stroke Paternal Grandmother        Social History     Socioeconomic History    Marital status: SINGLE     Spouse name: Not on file    Number of children: Not on file    Years of education: Not on file    Highest education level: Not on file   Occupational History    Not on file   Tobacco Use    Smoking status: Never Smoker    Smokeless tobacco: Never Used   Vaping Use    Vaping Use: Never used   Substance and Sexual Activity    Alcohol use: Never    Drug use: Never    Sexual activity: Not on file   Other Topics Concern    Not on file   Social History Narrative    Not on file     Social Determinants of Health     Financial Resource Strain:     Difficulty of Paying Living Expenses:    Food Insecurity:     Worried About Running Out of Food in the Last Year:     [address] in the Last Year:    Transportation Needs:     Lack of Transportation (Medical):      Lack of Transportation (Non-Medical):    Physical Activity:     Days of Exercise per Week:     Minutes of Exercise per Session:    Stress:     Feeling of Stress :    Social Connections:     Frequency of Communication with Friends and Family:     Frequency of Social Gatherings with Friends and Family:     Attends Orthodox Services:     Active Member of Clubs or Organizations:     Attends Club or Organization Meetings:     Marital Status:    Intimate Partner Violence:     Fear of Current or Ex-Partner:  Emotionally Abused:     Physically Abused:     Sexually Abused:        No Known Allergies    No current outpatient medications on file. No current facility-administered medications for this visit. Review of Systems   Constitutional: Negative. HENT: Negative. Eyes: Negative. Respiratory: Negative. Cardiovascular: Negative. Gastrointestinal: Negative. Genitourinary: Negative. Musculoskeletal: Negative. Skin: Negative. Neurological: Negative. Endo/Heme/Allergies: Negative. Psychiatric/Behavioral: Negative. /60 (BP 1 Location: Right arm, BP Patient Position: Sitting, BP Cuff Size: Adult)   Pulse 87   Temp 98.4 °F (36.9 °C) (Temporal)   Resp 18   Wt 212 lb 6.4 oz (96.3 kg)   SpO2 95%   BMI 38.85 kg/m²     Physical Exam  Constitutional:       General: He is not in acute distress. Appearance: Normal appearance. He is normal weight. He is not ill-appearing. HENT:      Head: Normocephalic and atraumatic. Cardiovascular:      Rate and Rhythm: Normal rate. Pulmonary:      Effort: Pulmonary effort is normal.      Breath sounds: Normal breath sounds. Abdominal:      General: There is no distension. Palpations: Abdomen is soft. Tenderness: There is no abdominal tenderness. Musculoskeletal:         General: Normal range of motion. Cervical back: Normal range of motion and neck supple. Skin:     General: Skin is warm and dry. Comments: Obvious sequelae of hidradenitis suppurativa bilateral axilla with multiple draining sinuses and scarring   Neurological:      General: No focal deficit present. Mental Status: He is alert and oriented to person, place, and time. Psychiatric:         Mood and Affect: Mood normal.         Thought Content:  Thought content normal.         Judgment: Judgment normal.         Problem List Items Addressed This Visit        Integumentary    Axillary hidradenitis suppurativa - Primary Assessment and Plan:    Extensive discussion with the patient regarding surgical management of his hidradenitis. I recommended excision which would be staged doing 1 side at a time. I reviewed the procedure and the risk and benefits. I reviewed the risk of infection, bleeding, wound complications, possible having open wounds require packing, recurrence. Mr. Espinoza May wishes to proceed and surgery has been scheduled for July 23, 2021.           Ethan Martino MD

## 2021-07-14 NOTE — PROGRESS NOTES
Identified pt with two pt identifiers(name and ). Reviewed record in preparation for visit and have obtained necessary documentation. Chief Complaint   Patient presents with    Follow-up     boils in [place]      Vitals:    21 1500 21 1501   BP: (!) 99/56 100/60   Pulse: 87    Resp: 18    Temp: 98.4 °F (36.9 °C)    TempSrc: Temporal    SpO2: 95%    Weight: 212 lb 6.4 oz (96.3 kg)    PainSc:   0 - No pain        Health Maintenance Review: Patient reminded of \"due or due soon\" health maintenance. I have asked the patient to contact his/her primary care provider (PCP) for follow-up on his/her health maintenance. Coordination of Care Questionnaire:  :   1) Have you been to an emergency room, urgent care, or hospitalized since your last visit? Yes for abt for armpit boils      2. Have seen or consulted any other health care provider since your last visit? If yes, where when, and reason for visit?  no    ADL Assessment 2021   Feeding yourself No Help Needed   Getting from bed to chair No Help Needed   Getting dressed No Help Needed   Bathing or showering No Help Needed   Walk across the room (includes cane/walker) No Help Needed   Using the telphone No Help Needed   Taking your medications No Help Needed   Preparing meals No Help Needed   Managing money (expenses/bills) No Help Needed   Moderately strenuous housework (laundry) No Help Needed   Shopping for personal items (toiletries/medicines) No Help Needed   Shopping for groceries No Help Needed   Driving No Help Needed   Climbing a flight of stairs No Help Needed   Getting to places beyond walking distances No Help Needed     Abuse Screening Questionnaire 2021   Do you ever feel afraid of your partner? N   Are you in a relationship with someone who physically or mentally threatens you? N   Is it safe for you to go home? Y     Who is the primary learner? Patient    What is the preferred language for health care of the primary learner? ENGLISH    How does the primary learner prefer to learn new concepts? VIDEOS    Answered By Patient    Relationship to Learner SELF      Pt states boils are under both armpits and wants the procedure done on both armpits.

## 2021-07-14 NOTE — H&P (VIEW-ONLY)
OFFICE VISIT NOTE    Samantha Freeman is a 26 y.o. male who presents to the office today for:    Chief Complaint   Patient presents with    Follow-up     boils in [facility]    Past Medical History:   Diagnosis Date    Hydradenitis        History reviewed. No pertinent surgical history. Family History   Problem Relation Age of Onset    Hypertension Mother     Hypertension Father     Diabetes Maternal Aunt     Stroke Paternal Grandmother        Social History     Socioeconomic History    Marital status: SINGLE     Spouse name: Not on file    Number of children: Not on file    Years of education: Not on file    Highest education level: Not on file   Occupational History    Not on file   Tobacco Use    Smoking status: Never Smoker    Smokeless tobacco: Never Used   Vaping Use    Vaping Use: Never used   Substance and Sexual Activity    Alcohol use: Never    Drug use: Never    Sexual activity: Not on file   Other Topics Concern    Not on file   Social History Narrative    Not on file     Social Determinants of Health     Financial Resource Strain:     Difficulty of Paying Living Expenses:    Food Insecurity:     Worried About Running Out of Food in the Last Year:     [address] in the Last Year:    Transportation Needs:     Lack of Transportation (Medical):      Lack of Transportation (Non-Medical):    Physical Activity:     Days of Exercise per Week:     Minutes of Exercise per Session:    Stress:     Feeling of Stress :    Social Connections:     Frequency of Communication with Friends and Family:     Frequency of Social Gatherings with Friends and Family:     Attends Hindu Services:     Active Member of Clubs or Organizations:     Attends Club or Organization Meetings:     Marital Status:    Intimate Partner Violence:     Fear of Current or Ex-Partner:  Emotionally Abused:     Physically Abused:     Sexually Abused:        No Known Allergies    No current outpatient medications on file. No current facility-administered medications for this visit. Review of Systems   Constitutional: Negative. HENT: Negative. Eyes: Negative. Respiratory: Negative. Cardiovascular: Negative. Gastrointestinal: Negative. Genitourinary: Negative. Musculoskeletal: Negative. Skin: Negative. Neurological: Negative. Endo/Heme/Allergies: Negative. Psychiatric/Behavioral: Negative. /60 (BP 1 Location: Right arm, BP Patient Position: Sitting, BP Cuff Size: Adult)   Pulse 87   Temp 98.4 °F (36.9 °C) (Temporal)   Resp 18   Wt 212 lb 6.4 oz (96.3 kg)   SpO2 95%   BMI 38.85 kg/m²     Physical Exam  Constitutional:       General: He is not in acute distress. Appearance: Normal appearance. He is normal weight. He is not ill-appearing. HENT:      Head: Normocephalic and atraumatic. Cardiovascular:      Rate and Rhythm: Normal rate. Pulmonary:      Effort: Pulmonary effort is normal.      Breath sounds: Normal breath sounds. Abdominal:      General: There is no distension. Palpations: Abdomen is soft. Tenderness: There is no abdominal tenderness. Musculoskeletal:         General: Normal range of motion. Cervical back: Normal range of motion and neck supple. Skin:     General: Skin is warm and dry. Comments: Obvious sequelae of hidradenitis suppurativa bilateral axilla with multiple draining sinuses and scarring   Neurological:      General: No focal deficit present. Mental Status: He is alert and oriented to person, place, and time. Psychiatric:         Mood and Affect: Mood normal.         Thought Content:  Thought content normal.         Judgment: Judgment normal.         Problem List Items Addressed This Visit        Integumentary    Axillary hidradenitis suppurativa - Primary Assessment and Plan:    Extensive discussion with the patient regarding surgical management of his hidradenitis. I recommended excision which would be staged doing 1 side at a time. I reviewed the procedure and the risk and benefits. I reviewed the risk of infection, bleeding, wound complications, possible having open wounds require packing, recurrence. Mr. Gaston Reina wishes to proceed and surgery has been scheduled for July 23, 2021.           Adelaide Sheriff MD

## 2021-07-19 ENCOUNTER — TELEPHONE (OUTPATIENT)
Dept: SURGERY | Age: 26
End: 2021-07-19

## 2021-07-23 ENCOUNTER — ANESTHESIA (OUTPATIENT)
Dept: SURGERY | Age: 26
End: 2021-07-23
Payer: MEDICAID

## 2021-07-23 ENCOUNTER — HOSPITAL ENCOUNTER (OUTPATIENT)
Age: 26
Setting detail: OBSERVATION
Discharge: HOME HEALTH | End: 2021-07-27
Attending: COLON & RECTAL SURGERY | Admitting: COLON & RECTAL SURGERY
Payer: MEDICAID

## 2021-07-23 ENCOUNTER — ANESTHESIA EVENT (OUTPATIENT)
Dept: SURGERY | Age: 26
End: 2021-07-23
Payer: MEDICAID

## 2021-07-23 DIAGNOSIS — L73.2 LEFT AXILLARY HIDRADENITIS: Primary | ICD-10-CM

## 2021-07-23 LAB
COVID-19 RAPID TEST, COVR: NOT DETECTED
SPECIMEN SOURCE: NORMAL

## 2021-07-23 PROCEDURE — 76010000153 HC OR TIME 1.5 TO 2 HR: Performed by: COLON & RECTAL SURGERY

## 2021-07-23 PROCEDURE — 77030002986 HC SUT PROL J&J -A: Performed by: COLON & RECTAL SURGERY

## 2021-07-23 PROCEDURE — 76060000034 HC ANESTHESIA 1.5 TO 2 HR: Performed by: COLON & RECTAL SURGERY

## 2021-07-23 PROCEDURE — 2709999900 HC NON-CHARGEABLE SUPPLY: Performed by: COLON & RECTAL SURGERY

## 2021-07-23 PROCEDURE — 88304 TISSUE EXAM BY PATHOLOGIST: CPT

## 2021-07-23 PROCEDURE — 77030031139 HC SUT VCRL2 J&J -A: Performed by: COLON & RECTAL SURGERY

## 2021-07-23 PROCEDURE — 74011000250 HC RX REV CODE- 250: Performed by: REGISTERED NURSE

## 2021-07-23 PROCEDURE — 11451 EXC SKN HDRDNT AX COMPLEX: CPT | Performed by: COLON & RECTAL SURGERY

## 2021-07-23 PROCEDURE — 74011250636 HC RX REV CODE- 250/636: Performed by: REGISTERED NURSE

## 2021-07-23 PROCEDURE — 87635 SARS-COV-2 COVID-19 AMP PRB: CPT

## 2021-07-23 PROCEDURE — 77030018831 HC SOL IRR H20 BAXT -A: Performed by: COLON & RECTAL SURGERY

## 2021-07-23 PROCEDURE — 77030041703 HC SLV COMPR DVT ARJO -B: Performed by: COLON & RECTAL SURGERY

## 2021-07-23 PROCEDURE — 74011000250 HC RX REV CODE- 250: Performed by: COLON & RECTAL SURGERY

## 2021-07-23 PROCEDURE — 74011250636 HC RX REV CODE- 250/636: Performed by: COLON & RECTAL SURGERY

## 2021-07-23 PROCEDURE — 99218 HC RM OBSERVATION: CPT

## 2021-07-23 PROCEDURE — 77030040361 HC SLV COMPR DVT MDII -B: Performed by: COLON & RECTAL SURGERY

## 2021-07-23 PROCEDURE — 77030002916 HC SUT ETHLN J&J -A: Performed by: COLON & RECTAL SURGERY

## 2021-07-23 PROCEDURE — 76210000016 HC OR PH I REC 1 TO 1.5 HR: Performed by: COLON & RECTAL SURGERY

## 2021-07-23 RX ORDER — SODIUM CHLORIDE 0.9 % (FLUSH) 0.9 %
5-40 SYRINGE (ML) INJECTION EVERY 8 HOURS
Status: DISCONTINUED | OUTPATIENT
Start: 2021-07-23 | End: 2021-07-23 | Stop reason: HOSPADM

## 2021-07-23 RX ORDER — SODIUM CHLORIDE, SODIUM LACTATE, POTASSIUM CHLORIDE, CALCIUM CHLORIDE 600; 310; 30; 20 MG/100ML; MG/100ML; MG/100ML; MG/100ML
INJECTION, SOLUTION INTRAVENOUS
Status: DISCONTINUED | OUTPATIENT
Start: 2021-07-23 | End: 2021-07-23 | Stop reason: HOSPADM

## 2021-07-23 RX ORDER — FENTANYL CITRATE 50 UG/ML
INJECTION, SOLUTION INTRAMUSCULAR; INTRAVENOUS AS NEEDED
Status: DISCONTINUED | OUTPATIENT
Start: 2021-07-23 | End: 2021-07-23 | Stop reason: HOSPADM

## 2021-07-23 RX ORDER — FENTANYL CITRATE 50 UG/ML
50 INJECTION, SOLUTION INTRAMUSCULAR; INTRAVENOUS AS NEEDED
Status: DISCONTINUED | OUTPATIENT
Start: 2021-07-23 | End: 2021-07-23 | Stop reason: HOSPADM

## 2021-07-23 RX ORDER — HYDRALAZINE HYDROCHLORIDE 20 MG/ML
10 INJECTION INTRAMUSCULAR; INTRAVENOUS
Status: DISCONTINUED | OUTPATIENT
Start: 2021-07-23 | End: 2021-07-23 | Stop reason: HOSPADM

## 2021-07-23 RX ORDER — DIPHENHYDRAMINE HYDROCHLORIDE 50 MG/ML
12.5 INJECTION, SOLUTION INTRAMUSCULAR; INTRAVENOUS AS NEEDED
Status: DISCONTINUED | OUTPATIENT
Start: 2021-07-23 | End: 2021-07-23 | Stop reason: HOSPADM

## 2021-07-23 RX ORDER — HYDROMORPHONE HYDROCHLORIDE 1 MG/ML
0.4 INJECTION, SOLUTION INTRAMUSCULAR; INTRAVENOUS; SUBCUTANEOUS
Status: DISCONTINUED | OUTPATIENT
Start: 2021-07-23 | End: 2021-07-23 | Stop reason: HOSPADM

## 2021-07-23 RX ORDER — SODIUM CHLORIDE 0.9 % (FLUSH) 0.9 %
5-40 SYRINGE (ML) INJECTION AS NEEDED
Status: DISCONTINUED | OUTPATIENT
Start: 2021-07-23 | End: 2021-07-23 | Stop reason: HOSPADM

## 2021-07-23 RX ORDER — SODIUM CHLORIDE 9 MG/ML
50 INJECTION, SOLUTION INTRAVENOUS CONTINUOUS
Status: DISCONTINUED | OUTPATIENT
Start: 2021-07-23 | End: 2021-07-27 | Stop reason: HOSPADM

## 2021-07-23 RX ORDER — MIDAZOLAM HYDROCHLORIDE 1 MG/ML
INJECTION, SOLUTION INTRAMUSCULAR; INTRAVENOUS AS NEEDED
Status: DISCONTINUED | OUTPATIENT
Start: 2021-07-23 | End: 2021-07-23 | Stop reason: HOSPADM

## 2021-07-23 RX ORDER — HYDROCODONE BITARTRATE AND ACETAMINOPHEN 5; 325 MG/1; MG/1
1 TABLET ORAL AS NEEDED
Status: DISCONTINUED | OUTPATIENT
Start: 2021-07-23 | End: 2021-07-23 | Stop reason: HOSPADM

## 2021-07-23 RX ORDER — CLINDAMYCIN PHOSPHATE 900 MG/50ML
900 INJECTION INTRAVENOUS ONCE
Status: COMPLETED | OUTPATIENT
Start: 2021-07-23 | End: 2021-07-23

## 2021-07-23 RX ORDER — HYDROMORPHONE HYDROCHLORIDE 1 MG/ML
INJECTION, SOLUTION INTRAMUSCULAR; INTRAVENOUS; SUBCUTANEOUS AS NEEDED
Status: DISCONTINUED | OUTPATIENT
Start: 2021-07-23 | End: 2021-07-23 | Stop reason: HOSPADM

## 2021-07-23 RX ORDER — NORETHINDRONE AND ETHINYL ESTRADIOL 0.5-0.035
5 KIT ORAL AS NEEDED
Status: DISCONTINUED | OUTPATIENT
Start: 2021-07-23 | End: 2021-07-23 | Stop reason: HOSPADM

## 2021-07-23 RX ORDER — OXYCODONE AND ACETAMINOPHEN 5; 325 MG/1; MG/1
2 TABLET ORAL
Status: DISCONTINUED | OUTPATIENT
Start: 2021-07-23 | End: 2021-07-27 | Stop reason: HOSPADM

## 2021-07-23 RX ORDER — SODIUM CHLORIDE, SODIUM LACTATE, POTASSIUM CHLORIDE, CALCIUM CHLORIDE 600; 310; 30; 20 MG/100ML; MG/100ML; MG/100ML; MG/100ML
20 INJECTION, SOLUTION INTRAVENOUS CONTINUOUS
Status: DISCONTINUED | OUTPATIENT
Start: 2021-07-23 | End: 2021-07-27 | Stop reason: HOSPADM

## 2021-07-23 RX ORDER — PROPOFOL 10 MG/ML
INJECTION, EMULSION INTRAVENOUS AS NEEDED
Status: DISCONTINUED | OUTPATIENT
Start: 2021-07-23 | End: 2021-07-23 | Stop reason: HOSPADM

## 2021-07-23 RX ORDER — CLINDAMYCIN PHOSPHATE 600 MG/50ML
600 INJECTION INTRAVENOUS EVERY 8 HOURS
Status: DISCONTINUED | OUTPATIENT
Start: 2021-07-23 | End: 2021-07-25

## 2021-07-23 RX ORDER — LIDOCAINE HYDROCHLORIDE 10 MG/ML
0.1 INJECTION, SOLUTION EPIDURAL; INFILTRATION; INTRACAUDAL; PERINEURAL AS NEEDED
Status: DISCONTINUED | OUTPATIENT
Start: 2021-07-23 | End: 2021-07-23 | Stop reason: HOSPADM

## 2021-07-23 RX ORDER — LIDOCAINE HYDROCHLORIDE 20 MG/ML
INJECTION, SOLUTION EPIDURAL; INFILTRATION; INTRACAUDAL; PERINEURAL AS NEEDED
Status: DISCONTINUED | OUTPATIENT
Start: 2021-07-23 | End: 2021-07-23 | Stop reason: HOSPADM

## 2021-07-23 RX ORDER — ONDANSETRON 2 MG/ML
4 INJECTION INTRAMUSCULAR; INTRAVENOUS AS NEEDED
Status: DISCONTINUED | OUTPATIENT
Start: 2021-07-23 | End: 2021-07-23 | Stop reason: HOSPADM

## 2021-07-23 RX ORDER — ESMOLOL HYDROCHLORIDE 10 MG/ML
INJECTION INTRAVENOUS AS NEEDED
Status: DISCONTINUED | OUTPATIENT
Start: 2021-07-23 | End: 2021-07-23 | Stop reason: HOSPADM

## 2021-07-23 RX ORDER — MIDAZOLAM HYDROCHLORIDE 1 MG/ML
0.5 INJECTION, SOLUTION INTRAMUSCULAR; INTRAVENOUS
Status: DISCONTINUED | OUTPATIENT
Start: 2021-07-23 | End: 2021-07-23 | Stop reason: HOSPADM

## 2021-07-23 RX ORDER — ONDANSETRON 2 MG/ML
4 INJECTION INTRAMUSCULAR; INTRAVENOUS
Status: DISCONTINUED | OUTPATIENT
Start: 2021-07-23 | End: 2021-07-27 | Stop reason: HOSPADM

## 2021-07-23 RX ORDER — FENTANYL CITRATE 50 UG/ML
50 INJECTION, SOLUTION INTRAMUSCULAR; INTRAVENOUS
Status: DISCONTINUED | OUTPATIENT
Start: 2021-07-23 | End: 2021-07-23 | Stop reason: HOSPADM

## 2021-07-23 RX ORDER — LABETALOL HCL 20 MG/4 ML
5 SYRINGE (ML) INTRAVENOUS
Status: DISCONTINUED | OUTPATIENT
Start: 2021-07-23 | End: 2021-07-23 | Stop reason: HOSPADM

## 2021-07-23 RX ORDER — METOPROLOL TARTRATE 5 MG/5ML
2.5 INJECTION INTRAVENOUS
Status: DISCONTINUED | OUTPATIENT
Start: 2021-07-23 | End: 2021-07-23 | Stop reason: HOSPADM

## 2021-07-23 RX ORDER — MIDAZOLAM HYDROCHLORIDE 1 MG/ML
1 INJECTION, SOLUTION INTRAMUSCULAR; INTRAVENOUS AS NEEDED
Status: DISCONTINUED | OUTPATIENT
Start: 2021-07-23 | End: 2021-07-23 | Stop reason: HOSPADM

## 2021-07-23 RX ORDER — ONDANSETRON 2 MG/ML
INJECTION INTRAMUSCULAR; INTRAVENOUS AS NEEDED
Status: DISCONTINUED | OUTPATIENT
Start: 2021-07-23 | End: 2021-07-23 | Stop reason: HOSPADM

## 2021-07-23 RX ORDER — DEXAMETHASONE SODIUM PHOSPHATE 4 MG/ML
INJECTION, SOLUTION INTRA-ARTICULAR; INTRALESIONAL; INTRAMUSCULAR; INTRAVENOUS; SOFT TISSUE AS NEEDED
Status: DISCONTINUED | OUTPATIENT
Start: 2021-07-23 | End: 2021-07-23 | Stop reason: HOSPADM

## 2021-07-23 RX ORDER — OXYCODONE AND ACETAMINOPHEN 5; 325 MG/1; MG/1
1 TABLET ORAL
Status: DISCONTINUED | OUTPATIENT
Start: 2021-07-23 | End: 2021-07-27 | Stop reason: HOSPADM

## 2021-07-23 RX ORDER — LIDOCAINE HYDROCHLORIDE AND EPINEPHRINE 10; 10 MG/ML; UG/ML
INJECTION, SOLUTION INFILTRATION; PERINEURAL AS NEEDED
Status: DISCONTINUED | OUTPATIENT
Start: 2021-07-23 | End: 2021-07-23 | Stop reason: HOSPADM

## 2021-07-23 RX ADMIN — ONDANSETRON 4 MG: 2 INJECTION INTRAMUSCULAR; INTRAVENOUS at 12:16

## 2021-07-23 RX ADMIN — SODIUM CHLORIDE, POTASSIUM CHLORIDE, SODIUM LACTATE AND CALCIUM CHLORIDE: 600; 310; 30; 20 INJECTION, SOLUTION INTRAVENOUS at 11:42

## 2021-07-23 RX ADMIN — HYDROMORPHONE HYDROCHLORIDE 1 MG: 1 INJECTION, SOLUTION INTRAMUSCULAR; INTRAVENOUS; SUBCUTANEOUS at 12:26

## 2021-07-23 RX ADMIN — FENTANYL CITRATE 50 MCG: 0.05 INJECTION, SOLUTION INTRAMUSCULAR; INTRAVENOUS at 12:17

## 2021-07-23 RX ADMIN — PROPOFOL 170 MG: 10 INJECTION, EMULSION INTRAVENOUS at 12:00

## 2021-07-23 RX ADMIN — DEXAMETHASONE SODIUM PHOSPHATE 4 MG: 4 INJECTION, SOLUTION INTRA-ARTICULAR; INTRALESIONAL; INTRAMUSCULAR; INTRAVENOUS; SOFT TISSUE at 12:14

## 2021-07-23 RX ADMIN — SODIUM CHLORIDE, POTASSIUM CHLORIDE, SODIUM LACTATE AND CALCIUM CHLORIDE: 600; 310; 30; 20 INJECTION, SOLUTION INTRAVENOUS at 11:51

## 2021-07-23 RX ADMIN — CLINDAMYCIN PHOSPHATE 900 MG: 900 INJECTION, SOLUTION INTRAVENOUS at 12:11

## 2021-07-23 RX ADMIN — FENTANYL CITRATE 100 MCG: 0.05 INJECTION, SOLUTION INTRAMUSCULAR; INTRAVENOUS at 12:49

## 2021-07-23 RX ADMIN — CLINDAMYCIN PHOSPHATE 600 MG: 600 INJECTION, SOLUTION INTRAVENOUS at 22:29

## 2021-07-23 RX ADMIN — ESMOLOL HYDROCHLORIDE 30 MG: 10 INJECTION, SOLUTION INTRAVENOUS at 12:20

## 2021-07-23 RX ADMIN — LIDOCAINE HYDROCHLORIDE 100 MG: 20 INJECTION, SOLUTION EPIDURAL; INFILTRATION; INTRACAUDAL; PERINEURAL at 12:00

## 2021-07-23 RX ADMIN — MIDAZOLAM HYDROCHLORIDE 2 MG: 2 INJECTION, SOLUTION INTRAMUSCULAR; INTRAVENOUS at 11:48

## 2021-07-23 RX ADMIN — CLINDAMYCIN PHOSPHATE 600 MG: 600 INJECTION, SOLUTION INTRAVENOUS at 14:27

## 2021-07-23 RX ADMIN — FENTANYL CITRATE 50 MCG: 0.05 INJECTION, SOLUTION INTRAMUSCULAR; INTRAVENOUS at 12:05

## 2021-07-23 NOTE — ANESTHESIA PREPROCEDURE EVALUATION
Relevant Problems   No relevant active problems       Anesthetic History   No history of anesthetic complications            Review of Systems / Medical History  Patient summary reviewed, nursing notes reviewed and pertinent labs reviewed    Pulmonary  Within defined limits                 Neuro/Psych   Within defined limits           Cardiovascular  Within defined limits                Exercise tolerance: >4 METS     GI/Hepatic/Renal  Within defined limits              Endo/Other        Obesity     Other Findings            Past Medical History:   Diagnosis Date    Hydradenitis        History reviewed. No pertinent surgical history.     No current outpatient medications    Current Facility-Administered Medications   Medication Dose Route Frequency    lactated Ringers infusion  20 mL/hr IntraVENous CONTINUOUS    clindamycin (CLEOCIN) 900mg D5W 50mL IVPB (premix)  900 mg IntraVENous ONCE       Patient Vitals for the past 24 hrs:   Temp Pulse Resp BP SpO2   07/23/21 0913 36.8 °C (98.3 °F) 83 18 114/68 98 %       Lab Results   Component Value Date/Time    WBC 6.4 12/31/2020 12:15 PM    HGB 12.5 12/31/2020 12:15 PM    HCT 38.5 12/31/2020 12:15 PM    PLATELET 557 66/78/5790 12:15 PM    MCV 83.3 12/31/2020 12:15 PM     Lab Results   Component Value Date/Time    Sodium 138 12/31/2020 12:15 PM    Potassium 3.2 (L) 12/31/2020 12:15 PM    Chloride 107 12/31/2020 12:15 PM    CO2 26 12/31/2020 12:15 PM    Anion gap 5 12/31/2020 12:15 PM    Glucose 106 (H) 12/31/2020 12:15 PM    BUN 12 12/31/2020 12:15 PM    Creatinine 0.80 12/31/2020 12:15 PM    BUN/Creatinine ratio 15 12/31/2020 12:15 PM    GFR est AA >60 12/31/2020 12:15 PM    GFR est non-AA >60 12/31/2020 12:15 PM    Calcium 8.4 (L) 12/31/2020 12:15 PM     No results found for: APTT, PTP, INR, INREXT  Lab Results   Component Value Date/Time    Glucose 106 (H) 12/31/2020 12:15 PM     Physical Exam    Airway  Mallampati: II  TM Distance: 4 - 6 cm  Neck ROM: normal range of motion   Mouth opening: Normal     Cardiovascular    Rhythm: regular  Rate: normal         Dental  No notable dental hx       Pulmonary  Breath sounds clear to auscultation               Abdominal  GI exam deferred       Other Findings            Anesthetic Plan    ASA: 2  Anesthesia type: general          Induction: Intravenous  Anesthetic plan and risks discussed with: Patient and Family

## 2021-07-23 NOTE — OP NOTES
Operative Note    Patient: Nova Stevens  YOB: 1995  MRN: [id number]    Date of Procedure: 7/23/2021     Pre-Op Diagnosis: Left axillary hidradenitis suppurativa    Post-Op Diagnosis: Same as preoperative diagnosis. Procedure(s):  EXCISION LEFT AXILLARY HYDRADENITIS SUPPURATIVA (URGENT)    Surgeon(s):  Dayday Reyes MD    Surgical Assistant: None    Anesthesia: General     Estimated Blood Loss (mL):  68NC    Complications: None    Specimens:   ID Type Source Tests Collected by Time Destination   1 : left axillary hydradenitits Preservative Axillary  Dayday Reyes MD 7/23/2021 1234 Pathology        Implants: * No implants in log *    Drains: * No LDAs found *    Findings: Large area of hidradenitis left axilla    Detailed Description of Procedure: The patient was brought to the operating room and positioned on the OR table in the supine position. Following the induction of general anesthesia the left axilla and upper arm and left shoulder were prepped and draped out in the standard sterile fashion. A surgical timeout then performed at which time the patient's identity and surgical procedure were once again confirmed. On inspection there was multiple draining sinuses present. The total area measured approximately 25 cm x 15 cm. This was excised in an elliptical fashion. It was marked out and a scalpel was used to incise the skin and the electrocautery to excise the area of hidradenitis which was then passed off as specimen. After ensuring meticulous hemostasis skin flaps were elevated in all directions to allow for closure. Deep tissues were reapproximated with 2-0 Vicryl sutures and deep dermal layers were reapproximated 2-0 Vicryl sutures as well. 2-0 Prolene vertical mattress sutures were used to reapproximate the skin. The midportion of the wound was left open as it replaced too much tension on the incision and possibly impaired mobility of the shoulder.   This is left open to heal with secondary intent. At this point the procedure was terminated. The open wound was packed with saline gauze. Dressings were applied the patient awakened, extubated, taken recovery in stable condition. All counts are correct to close the case.     Electronically Signed by Brook Phipps MD on 7/23/2021 at 1:49 PM

## 2021-07-23 NOTE — PROGRESS NOTES
Primary Nurse Derrell Clark LPN and Lorenzo Callaway LPN performed a dual skin assessment on this patient Impairment noted- see wound doc flow sheet  Jimbo score is 22  Surgical wound noted to left should/armpit. Open wound to right shoulder/chest approximately the size of a quarter.

## 2021-07-23 NOTE — INTERVAL H&P NOTE
Update History & Physical    The Patient's History and Physical of July 14, 2021 was reviewed with the patient and I examined the patient. There was no change. The surgical site was confirmed by the patient and me. Plan:  The risk, benefits, expected outcome, and alternative to the recommended procedure have been discussed with the patient. Patient understands and wants to proceed with the procedure.     Electronically signed by Frances Parks MD on 7/23/2021 at 11:40 AM

## 2021-07-23 NOTE — PROGRESS NOTES
Float nurse Javi Mack stated cannot see a vein for an IV, [address] Nurse stated did not see a vein for an IV. Dr. Bk Pope notified to look for patient IV. Patient resting, VS WNL and assessment WNL. Will continue to monitor patient.

## 2021-07-24 LAB
BASOPHILS # BLD: 0 K/UL (ref 0–0.1)
BASOPHILS NFR BLD: 0 % (ref 0–1)
DIFFERENTIAL METHOD BLD: NORMAL
EOSINOPHIL # BLD: 0 K/UL (ref 0–0.4)
EOSINOPHIL NFR BLD: 0 % (ref 0–7)
ERYTHROCYTE [DISTWIDTH] IN BLOOD BY AUTOMATED COUNT: 14.1 % (ref 11.5–14.5)
HCT VFR BLD AUTO: 38.4 % (ref 36.6–50.3)
HGB BLD-MCNC: 12.2 G/DL (ref 12.1–17)
IMM GRANULOCYTES # BLD AUTO: 0 K/UL (ref 0–0.04)
IMM GRANULOCYTES NFR BLD AUTO: 0 % (ref 0–0.5)
LYMPHOCYTES # BLD: 1.8 K/UL (ref 0.8–3.5)
LYMPHOCYTES NFR BLD: 18 % (ref 12–49)
MCH RBC QN AUTO: 26.6 PG (ref 26–34)
MCHC RBC AUTO-ENTMCNC: 31.8 G/DL (ref 30–36.5)
MCV RBC AUTO: 83.7 FL (ref 80–99)
MONOCYTES # BLD: 0.7 K/UL (ref 0–1)
MONOCYTES NFR BLD: 7 % (ref 5–13)
NEUTS SEG # BLD: 7.5 K/UL (ref 1.8–8)
NEUTS SEG NFR BLD: 75 % (ref 32–75)
NRBC # BLD: 0 K/UL (ref 0–0.01)
NRBC BLD-RTO: 0 PER 100 WBC
PLATELET # BLD AUTO: 330 K/UL (ref 150–400)
PMV BLD AUTO: 9.5 FL (ref 8.9–12.9)
RBC # BLD AUTO: 4.59 M/UL (ref 4.1–5.7)
WBC # BLD AUTO: 10 K/UL (ref 4.1–11.1)

## 2021-07-24 PROCEDURE — 96374 THER/PROPH/DIAG INJ IV PUSH: CPT

## 2021-07-24 PROCEDURE — 36415 COLL VENOUS BLD VENIPUNCTURE: CPT

## 2021-07-24 PROCEDURE — 99218 HC RM OBSERVATION: CPT

## 2021-07-24 PROCEDURE — 96376 TX/PRO/DX INJ SAME DRUG ADON: CPT

## 2021-07-24 PROCEDURE — 99024 POSTOP FOLLOW-UP VISIT: CPT | Performed by: COLON & RECTAL SURGERY

## 2021-07-24 PROCEDURE — 74011250636 HC RX REV CODE- 250/636: Performed by: COLON & RECTAL SURGERY

## 2021-07-24 PROCEDURE — 85025 COMPLETE CBC W/AUTO DIFF WBC: CPT

## 2021-07-24 RX ADMIN — CLINDAMYCIN PHOSPHATE 600 MG: 600 INJECTION, SOLUTION INTRAVENOUS at 16:26

## 2021-07-24 RX ADMIN — CLINDAMYCIN PHOSPHATE 600 MG: 600 INJECTION, SOLUTION INTRAVENOUS at 22:41

## 2021-07-24 NOTE — PROGRESS NOTES
Progress Note    Patient: Giacomo Sims MRN: [id number]  SSN: [id number]    YOB: 1995  Age: 26 y.o. Sex: male      Admit Date: 7/23/2021    LOS: 0 days     Subjective:   Postoperative day 1 status post excision of left axillary hidradenitis suppurativa  Patient seen in bed without complaints    Objective:     Vitals:    07/23/21 1601 07/23/21 2003 07/24/21 0106 07/24/21 0903   BP: 109/70 (!) 92/50 (!) 90/55 124/64   Pulse: 85 84 80 80   Resp: 19 18 20 20   Temp: 98 °F (36.7 °C) 98.3 °F (36.8 °C) 98.1 °F (36.7 °C) 98.1 °F (36.7 °C)   SpO2: 99% 99% 95% (!) 77%   Weight:       Height:            Intake and Output:  Current Shift: No intake/output data recorded. Last three shifts: 07/22 1901 - 07/24 0700  In: 1350 [I.V.:1350]  Out: 50     Review of Systems:  ROS     Physical Exam:   Left axilla sutures are in place, midportion of the wound is open and clean,    Lab/Data Review:  Recent Results (from the past 24 hour(s))   CBC WITH AUTOMATED DIFF    Collection Time: 07/24/21  8:55 AM   Result Value Ref Range    WBC 10.0 4.1 - 11.1 K/uL    RBC 4.59 4. 10 - 5.70 M/uL    HGB 12.2 12.1 - 17.0 g/dL    HCT 38.4 36.6 - 50.3 %    MCV 83.7 80.0 - 99.0 FL    MCH 26.6 26.0 - 34.0 PG    MCHC 31.8 30.0 - 36.5 g/dL    RDW 14.1 11.5 - 14.5 %    PLATELET 903 413 - 078 K/uL    MPV 9.5 8.9 - 12.9 FL    NRBC 0.0 0.0  WBC    ABSOLUTE NRBC 0.00 0.00 - 0.01 K/uL    NEUTROPHILS 75 32 - 75 %    LYMPHOCYTES 18 12 - 49 %    MONOCYTES 7 5 - 13 %    EOSINOPHILS 0 0 - 7 %    BASOPHILS 0 0 - 1 %    IMMATURE GRANULOCYTES 0 0 - 0.5 %    ABS. NEUTROPHILS 7.5 1.8 - 8.0 K/UL    ABS. LYMPHOCYTES 1.8 0.8 - 3.5 K/UL    ABS. MONOCYTES 0.7 0.0 - 1.0 K/UL    ABS. EOSINOPHILS 0.0 0.0 - 0.4 K/UL    ABS. BASOPHILS 0.0 0.0 - 0.1 K/UL    ABS. IMM.  GRANS. 0.0 0.00 - 0.04 K/UL    DF AUTOMATED              Assessment:     Active Problems:    Hydradenitis (7/23/2021)      Left axillary hidradenitis (7/23/2021)        Plan:   Saline wet-to-dry dressings to open wound twice daily  Case management for arrangement for home health  Plan discharge home tomorrow    Signed By: Deshawn Lopez MD     July 24, 2021

## 2021-07-25 PROCEDURE — 99218 HC RM OBSERVATION: CPT

## 2021-07-25 PROCEDURE — 99024 POSTOP FOLLOW-UP VISIT: CPT | Performed by: COLON & RECTAL SURGERY

## 2021-07-25 PROCEDURE — 96376 TX/PRO/DX INJ SAME DRUG ADON: CPT

## 2021-07-25 PROCEDURE — 74011250637 HC RX REV CODE- 250/637: Performed by: COLON & RECTAL SURGERY

## 2021-07-25 PROCEDURE — 74011250636 HC RX REV CODE- 250/636: Performed by: COLON & RECTAL SURGERY

## 2021-07-25 RX ORDER — CLINDAMYCIN HYDROCHLORIDE 150 MG/1
300 CAPSULE ORAL EVERY 6 HOURS
Status: DISCONTINUED | OUTPATIENT
Start: 2021-07-25 | End: 2021-07-27 | Stop reason: HOSPADM

## 2021-07-25 RX ADMIN — CLINDAMYCIN HYDROCHLORIDE 300 MG: 150 CAPSULE ORAL at 23:18

## 2021-07-25 RX ADMIN — CLINDAMYCIN HYDROCHLORIDE 300 MG: 150 CAPSULE ORAL at 17:11

## 2021-07-25 RX ADMIN — CLINDAMYCIN HYDROCHLORIDE 300 MG: 150 CAPSULE ORAL at 12:36

## 2021-07-25 RX ADMIN — CLINDAMYCIN PHOSPHATE 600 MG: 600 INJECTION, SOLUTION INTRAVENOUS at 08:34

## 2021-07-25 NOTE — PROGRESS NOTES
Transition of Care Plan   RUR- N/A    DISPOSITION: The disposition plan is pending medical progression and recommendation.  F/U with PCP/Specialist     Transport: Family - Cousin     Reason for Admission: \"had to get surgery on my arm\"                  RUR Score: N/A                    Plan for utilizing home health: N/A         PCP: First and Last name:  Scott Valencia MD     [facility] of [facility]   Are you a current patient: Yes/No: Yes   Approximate date of last visit: 3 months ago   Can you participate in a virtual visit with your PCP: N/A                    Current Advanced Directive/Advance Care Plan: No Order  Has no ACP documentation on file at this time. Healthcare Decision Maker:   Click here to complete [address] including selection of the Healthcare Decision Maker Relationship (ie \"Primary\")   Cousin - [address] [place] [address]                       Transition of Care Plan:  Pending recommendation                     Reviewed chart for transitions of care and discussed in rounds. CM met with patient at bedside to explain role and offer support. Patient is alert and oriented x4 and confirmed demographics. Baseline: Does not utilize DME   ADLs/IDALS: needs assistance   Previous Home Health: N/A  Previous SNF/IPR:N/A  ER Contact: [facility] - [phone]    Patient lives in a 1 level house with 5 steps to enter. Patient reports that he lives with family members. Patient reports that he needs assistance with ADLs/IDALs. Patient reports that he does not utilize DMEs to ambulate. Patient's preferred pharmacy is [facility] located on [address] for his prescriptions. Patient's cousin is expected to transport at discharge. Care Management Interventions  PCP Verified by CM: Yes  Palliative Care Criteria Met (RRAT>21 & CHF Dx)?: No  Mode of Transport at Discharge:  Other (see comment)  Transition of Care Consult (CM Consult): Discharge Planning  MyChart Signup: No  Discharge Durable Medical Equipment: No  Physical Therapy Consult: No  Occupational Therapy Consult: No  Speech Therapy Consult: No  Current Support Network: Relative's Home  Confirm Follow Up Transport: Family  The Patient and/or Patient Representative was Provided with a Choice of Provider and Agrees with the Discharge Plan?: Yes  Freedom of Choice List was Provided with Basic Dialogue that Supports the Patient's Individualized Plan of Care/Goals, Treatment Preferences and Shares the Quality Data Associated with the Providers?: Yes   Resource Information Provided?: No    CM will continue to follow, provide support and assist with GOVIND needs as they arise.     ANDREZ Hyman, CRM, LMHP-e

## 2021-07-25 NOTE — PROGRESS NOTES
Progress Note    Patient: Heron Turner MRN: [id number]  SSN: [id number]    YOB: 1995  Age: 26 y.o. Sex: male      Admit Date: 7/23/2021    LOS: 0 days     Subjective:   Postoperative day 2 status post excision of left axillary hidradenitis suppurativa  Patient seen in bed  Has no complaints    Objective:     Vitals:    07/24/21 1634 07/24/21 2036 07/25/21 0308 07/25/21 0757   BP: (!) 124/56 (!) 99/51 (!) 97/55 109/68   Pulse: 86 89 84 82   Resp: 20 20 18 16   Temp: 98.1 °F (36.7 °C) 98.2 °F (36.8 °C) 98.1 °F (36.7 °C) 98.2 °F (36.8 °C)   SpO2: 98% 97% 99% 99%   Weight:       Height:            Intake and Output:  Current Shift: No intake/output data recorded. Last three shifts: 07/23 1901 - 07/25 0700  In: -   Out: 400 [Urine:400]    Review of Systems:  ROS     Physical Exam:   Left axilla suture lines are intact, midportion of wound open clean    Lab/Data Review:  No results found for this or any previous visit (from the past 24 hour(s)).        Assessment:     Active Problems:    Hydradenitis (7/23/2021)      Left axillary hidradenitis (7/23/2021)        Plan:   Doing well  Continue twice daily saline wet-to-dry dressings  Awaiting home health arrangements to be made for discharge, case management pending    Signed By: Jeannie Danielle MD     July 25, 2021

## 2021-07-26 PROCEDURE — 74011250637 HC RX REV CODE- 250/637: Performed by: COLON & RECTAL SURGERY

## 2021-07-26 PROCEDURE — 99024 POSTOP FOLLOW-UP VISIT: CPT | Performed by: COLON & RECTAL SURGERY

## 2021-07-26 PROCEDURE — 99218 HC RM OBSERVATION: CPT

## 2021-07-26 RX ORDER — OXYCODONE AND ACETAMINOPHEN 5; 325 MG/1; MG/1
1 TABLET ORAL
Qty: 24 TABLET | Refills: 0 | Status: SHIPPED | OUTPATIENT
Start: 2021-07-26 | End: 2021-08-01

## 2021-07-26 RX ADMIN — CLINDAMYCIN HYDROCHLORIDE 300 MG: 150 CAPSULE ORAL at 17:35

## 2021-07-26 RX ADMIN — CLINDAMYCIN HYDROCHLORIDE 300 MG: 150 CAPSULE ORAL at 05:31

## 2021-07-26 RX ADMIN — CLINDAMYCIN HYDROCHLORIDE 300 MG: 150 CAPSULE ORAL at 12:20

## 2021-07-26 NOTE — DISCHARGE INSTRUCTIONS
Saline wet-to-dry dressings to open wound twice a day  Patient may shower normally before first dressing change in the morning  Follow-up my office 2 weeks

## 2021-07-26 NOTE — PROGRESS NOTES
DC Plan: Home Health    CM met with pt at the bedside to f/up on his plan of care. MD would like pt set up with home health for wound care. Pt signed choice letter. Cm discussed having a family member come to the hospital to receive wound care teaching because home health does not come out every day. Pt indicated he is going to make some phone calls to see who can come to the hospital to receive wound care teaching. Pt's nurse will provide pt with some wound care supplies prior to discharge. Referral made via [facility] [place] [id number]. Cm spoke with Dr. Christie Astudillo regarding modifying discharge order to include SN for wound care. CM was given verbal permission to modify discharge order.     __________________________________________________________    Pt accepted with [facility].  SOC: 7/27/21

## 2021-07-26 NOTE — PROGRESS NOTES
Family wants to send a lift to  patient. Explained to patients family that there needs to be education given on wound care before patients is discharge. Called and made Dr. Beatriz Gar aware he states that he hold off on patient discharge today.

## 2021-07-26 NOTE — DISCHARGE SUMMARY
Admit date: 7/23/2021   Admitting Provider: Dayday Deluna MD    Discharge date: 7/26/2021  Discharging Provider: Dayday Deluna MD      * Admission Diagnoses: Hydradenitis [L73.2]; Left axillary hidradenitis [L73.2]    * Discharge Diagnoses: Same  Hospital Problems as of 7/26/2021 Date Reviewed: 7/14/2021        Codes Class Noted - Resolved POA    Hydradenitis ICD-10-CM: L73.2  ICD-9-CM: 705.83  7/23/2021 - Present Unknown        Left axillary hidradenitis ICD-10-CM: L73.2  ICD-9-CM: 705.83  7/23/2021 - Present Unknown              * Hospital Course: Patient is a 60-year-old gentleman with a history of axillary hidradenitis suppurativa who presented to the hospital on July 23, 2021 for elective excision of left axillary hidradenitis. He underwent surgery that date. For full operative details refer to operative note. He did have an open wound in the midportion of his incision site. This is packed with saline wet-to-dry dressings twice daily and the patient was admitted pending home health services. At the time of discharge patient home health approved and was discharged home with instructions at twice daily saline wet-to-dry dressings. Patient follow-up in my office in 2 weeks. * Procedures:   Procedure(s):  EXCISION LEFT AXILLARY HYDRADENITIS SUPPURATIVA (URGENT)    Consults: None      Discharge Exam:  Physical Exam  Constitutional:       General: He is not in acute distress. Appearance: Normal appearance. He is obese. He is not ill-appearing. HENT:      Head: Normocephalic and atraumatic. Cardiovascular:      Rate and Rhythm: Normal rate. Pulmonary:      Effort: Pulmonary effort is normal.      Breath sounds: Normal breath sounds. Abdominal:      General: There is no distension. Palpations: Abdomen is soft. Tenderness: There is no abdominal tenderness. Musculoskeletal:         General: Normal range of motion. Cervical back: Neck supple.    Skin:     General: Skin is warm and dry. Comments: Left axilla excision site clean, sutures in place central open wound clean   Neurological:      General: No focal deficit present. Mental Status: He is alert. Mental status is at baseline. Psychiatric:         Mood and Affect: Mood normal.         Thought Content: Thought content normal.         Judgment: Judgment normal.         * Discharge Condition: good  * Disposition: Home    Discharge Medications:  Current Discharge Medication List      START taking these medications    Details   oxyCODONE-acetaminophen (PERCOCET) 5-325 mg per tablet Take 1 Tablet by mouth every six (6) hours as needed for Pain for up to 6 days. Max Daily Amount: 4 Tablets. Qty: 24 Tablet, Refills: 0  Start date: 7/26/2021, End date: 8/1/2021    Associated Diagnoses: Left axillary hidradenitis             * Follow-up Care/Patient Instructions:   Activity: Activity as tolerated  Diet: Regular Diet  Wound Care: Saline wet-to-dry dressings to axillary wound twice daily    Follow-up Information     Follow up With Specialties Details Why Contact Info    New Villalta MD Colon and Rectal Surgery, General Surgery On 8/11/2021  [address] [place] [address]  [phone]      Renan Horn MD Internal Medicine   [address] [place] [address]  [phone]        Approximately 35 minutes was spent on discharge of which greater than half was spent on patient counseling and coordination of care    Discharge delayed yesterday secondary to family member not being able to come in to receive wound care instructions and confusion regarding when home health will be going to the home to deliver supplies and do wound care    No change in patient's exam    Patient will be discharged home this a.m. with home health  Signed:  Alina Hankins MD  7/26/2021  11:22 AM

## 2021-07-26 NOTE — PROGRESS NOTES
Spoke with Mikhail Kaur per patients request to see when she will come  patient, She states that his mother was suppose to  patient and learn how to do the dressing change. Patient states that he will try other family members to pick him up. He states the the last time he spoke with his mother was around 4pm today.

## 2021-07-27 VITALS
OXYGEN SATURATION: 98 % | SYSTOLIC BLOOD PRESSURE: 125 MMHG | HEART RATE: 96 BPM | DIASTOLIC BLOOD PRESSURE: 71 MMHG | BODY MASS INDEX: 37.91 KG/M2 | HEIGHT: 62 IN | RESPIRATION RATE: 18 BRPM | TEMPERATURE: 98.2 F | WEIGHT: 206 LBS

## 2021-07-27 PROCEDURE — 99218 HC RM OBSERVATION: CPT

## 2021-07-27 PROCEDURE — 74011250637 HC RX REV CODE- 250/637: Performed by: COLON & RECTAL SURGERY

## 2021-07-27 PROCEDURE — 99024 POSTOP FOLLOW-UP VISIT: CPT | Performed by: COLON & RECTAL SURGERY

## 2021-07-27 RX ADMIN — CLINDAMYCIN HYDROCHLORIDE 300 MG: 150 CAPSULE ORAL at 00:35

## 2021-07-27 RX ADMIN — CLINDAMYCIN HYDROCHLORIDE 300 MG: 150 CAPSULE ORAL at 05:16

## 2021-07-27 NOTE — PROGRESS NOTES
Discharge plan of care/case management plan validated with provider discharge order. Reviewed discharge instruction with rupinder Owen, dressing changes reviewed with Rajat Webb how to perform dressing change and the frequency of dressing change. All medication reviewed with rupinder Webb and patient, when to call the md, and the follow up appointment. IV removed.  Dressing complete by Dr. Florencia Adams this am.

## 2021-07-27 NOTE — PROGRESS NOTES
Progress Note    Patient: Esteban Long MRN: [id number]  SSN: [id number]    YOB: 1995  Age: 26 y.o. Sex: male      Admit Date: 7/23/2021    LOS: 0 days     Subjective:   Patient seen in bed  No complaints    Objective:     Vitals:    07/25/21 2130 07/26/21 0802 07/26/21 1550 07/26/21 2014   BP: (!) 101/54 104/61 98/60 114/66   Pulse: 91 94 99 88   Resp: 18 16 18 16   Temp: 98.7 °F (37.1 °C) 97.9 °F (36.6 °C) 97.4 °F (36.3 °C) 98.8 °F (37.1 °C)   SpO2: 99% 98%  98%   Weight:       Height:            Intake and Output:  Current Shift: No intake/output data recorded. Last three shifts: 07/25 0701 - 07/26 1900  In: 1800 [P.O.:1800]  Out: 1700 [Urine:1700]    Review of Systems:  ROS     Physical Exam:   Left axilla open wound clean, sutures intact , no drainage    Lab/Data Review:  No results found for this or any previous visit (from the past 24 hour(s)).        Assessment:     Active Problems:    Hydradenitis (7/23/2021)      Left axillary hidradenitis (7/23/2021)        Plan:     Discharge home today with home health  Follow up two weeks    Signed By: Frances Parks MD     July 27, 2021

## 2021-07-28 ENCOUNTER — TELEPHONE (OUTPATIENT)
Dept: SURGERY | Age: 26
End: 2021-07-28

## 2021-07-28 NOTE — TELEPHONE ENCOUNTER
Ilene Dhaliwal from [address] [address] called stated patient is being admitted for home health today 07/28/2021 with wound care twice a day with wet to dry dressing.  Please call Ilene Dhaliwal [phone] if any questions

## 2021-08-06 ENCOUNTER — TELEPHONE (OUTPATIENT)
Dept: SURGERY | Age: 26
End: 2021-08-06

## 2021-08-06 NOTE — TELEPHONE ENCOUNTER
ERROR  Routed message to incorrect doctor/[facility](Rose) called back to have message directed to the correct doctor Dr Priya Dennis that is treating this patient . Rose from [address] [address] called stated patient has a new 5cm tunnel wound that they are packing. Stated if additional order for this issue is needed please call Rose [phone] stated wanted the doctor to be aware.  Patient has appointment on 08/13/2021 with Dr Priya Dennis

## 2021-08-06 NOTE — TELEPHONE ENCOUNTER
Called and spoke to pt. He says wound is doing great. He says the home health is coming twice a week on wens and fri. I reminded him he has an appointment with Dr. Brock Costa on 08/11/21. I ask him to try to get his home health visit on 08/10/21 and then when he sees Dr. Brock Costa on wens -changes can be made for the Friday home health visit. Pt verbalized understanding of these instructions.

## 2021-08-11 ENCOUNTER — OFFICE VISIT (OUTPATIENT)
Dept: SURGERY | Age: 26
End: 2021-08-11
Payer: MEDICAID

## 2021-08-11 VITALS
WEIGHT: 204.8 LBS | DIASTOLIC BLOOD PRESSURE: 65 MMHG | TEMPERATURE: 98.6 F | HEIGHT: 62 IN | HEART RATE: 81 BPM | BODY MASS INDEX: 37.69 KG/M2 | SYSTOLIC BLOOD PRESSURE: 101 MMHG | OXYGEN SATURATION: 97 %

## 2021-08-11 DIAGNOSIS — L73.2 LEFT AXILLARY HIDRADENITIS: Primary | ICD-10-CM

## 2021-08-11 PROCEDURE — 99024 POSTOP FOLLOW-UP VISIT: CPT | Performed by: COLON & RECTAL SURGERY

## 2021-08-11 NOTE — PROGRESS NOTES
Chief Complaint   Patient presents with    Follow-up     post op     Visit Vitals  /65 (BP 1 Location: Right arm, BP Patient Position: Sitting, BP Cuff Size: Adult)   Pulse 81   Temp 98.6 °F (37 °C) (Temporal)   Ht 5' 2\" (1.575 m)   Wt 204 lb 12.8 oz (92.9 kg)   SpO2 97%   BMI 37.46 kg/m²

## 2021-08-25 ENCOUNTER — OFFICE VISIT (OUTPATIENT)
Dept: SURGERY | Age: 26
End: 2021-08-25
Payer: MEDICAID

## 2021-08-25 VITALS
SYSTOLIC BLOOD PRESSURE: 104 MMHG | WEIGHT: 209.6 LBS | BODY MASS INDEX: 38.57 KG/M2 | HEIGHT: 62 IN | DIASTOLIC BLOOD PRESSURE: 71 MMHG | HEART RATE: 87 BPM | OXYGEN SATURATION: 97 % | TEMPERATURE: 98.4 F

## 2021-08-25 DIAGNOSIS — L73.2 LEFT AXILLARY HIDRADENITIS: Primary | ICD-10-CM

## 2021-08-25 PROCEDURE — 99024 POSTOP FOLLOW-UP VISIT: CPT | Performed by: COLON & RECTAL SURGERY

## 2021-08-25 NOTE — PROGRESS NOTES
OFFICE VISIT NOTE    Roopa Hemphill is a 26 y.o. male who presents to the office today for:    Chief Complaint   Patient presents with    Follow-up     left axillary HS       Mr. Jaiden Sanz comes in today for follow-up status post excision of left axillary hidradenitis suppurativa on July 23, 2021. He has no complaints today. He has been doing saline wet-to-dry dressings to the open wound. He denies any pain at the site. He denies any excessive drainage. Past Medical History:   Diagnosis Date    Hydradenitis        History reviewed. No pertinent surgical history. Family History   Problem Relation Age of Onset    Hypertension Mother     Hypertension Father     Diabetes Maternal Aunt     Stroke Paternal Grandmother        Social History     Socioeconomic History    Marital status: SINGLE     Spouse name: Not on file    Number of children: Not on file    Years of education: Not on file    Highest education level: Not on file   Occupational History    Not on file   Tobacco Use    Smoking status: Never Smoker    Smokeless tobacco: Never Used   Vaping Use    Vaping Use: Never used   Substance and Sexual Activity    Alcohol use: Never    Drug use: Never    Sexual activity: Not on file   Other Topics Concern    Not on file   Social History Narrative    Not on file     Social Determinants of Health     Financial Resource Strain:     Difficulty of Paying Living Expenses:    Food Insecurity:     Worried About Running Out of Food in the Last Year:     [address] in the Last Year:    Transportation Needs:     Lack of Transportation (Medical):      Lack of Transportation (Non-Medical):    Physical Activity:     Days of Exercise per Week:     Minutes of Exercise per Session:    Stress:     Feeling of Stress :    Social Connections:     Frequency of Communication with Friends and Family:     Frequency of Social Gatherings with Friends and Family:     Attends Hinduism Services:     Active Member of Clubs or Organizations:     Attends Club or Organization Meetings:     Marital Status:    Intimate Partner Violence:     Fear of Current or Ex-Partner:     Emotionally Abused:     Physically Abused:     Sexually Abused:        No Known Allergies    No current outpatient medications on file. No current facility-administered medications for this visit. Review of Systems   All other systems reviewed and are negative. /71 (BP 1 Location: Right arm, BP Patient Position: Sitting, BP Cuff Size: Adult)   Pulse 87   Temp 98.4 °F (36.9 °C) (Temporal)   Ht 5' 2\" (1.575 m)   Wt 209 lb 9.6 oz (95.1 kg)   SpO2 97%   BMI 38.34 kg/m²     Physical Exam  Skin:     Comments: His left axillary excision site is clean. Open wound is granulating nicely.   There is no necrotic or fibrinous material.         Problem List Items Addressed This Visit        Integumentary    Left axillary hidradenitis - Primary          Assessment and Plan:  Continue twice daily saline wet-to-dry dressing  follow-up my office 3 weeks  patient may return to work with no restrictions            Minerva Harrison MD

## 2021-08-25 NOTE — PROGRESS NOTES
Chief Complaint   Patient presents with    Follow-up     left axillary HS     Visit Vitals  /71 (BP 1 Location: Right arm, BP Patient Position: Sitting, BP Cuff Size: Adult)   Pulse 87   Temp 98.4 °F (36.9 °C) (Temporal)   Ht 5' 2\" (1.575 m)   Wt 209 lb 9.6 oz (95.1 kg)   SpO2 97%   BMI 38.34 kg/m²     1. Have you been to the ER, urgent care clinic since your last visit? Hospitalized since your last visit? No    2. Have you seen or consulted any other health care providers outside of the [address] [place] [address] since your last visit? Include any pap smears or colon screening.  No

## 2021-08-31 NOTE — PROGRESS NOTES
OFFICE VISIT NOTE    Antonella Alvarez is a 26 y.o. male who presents to the office today for:    Chief Complaint   Patient presents with    Follow-up     post op       Mr. Sharmaine Fitzgerald comes in today for follow-up status post excision of left axillary hidradenitis on July 23, 2021. The midportion of his wound was left open at the time of surgery. He has been packing this with saline wet-to-dry dressings. Today he has no complaints. He does state he is having some drainage from the wound although he feels this is improving. Past Medical History:   Diagnosis Date    Hydradenitis        History reviewed. No pertinent surgical history. Family History   Problem Relation Age of Onset    Hypertension Mother     Hypertension Father     Diabetes Maternal Aunt     Stroke Paternal Grandmother        Social History     Socioeconomic History    Marital status: SINGLE     Spouse name: Not on file    Number of children: Not on file    Years of education: Not on file    Highest education level: Not on file   Occupational History    Not on file   Tobacco Use    Smoking status: Never Smoker    Smokeless tobacco: Never Used   Vaping Use    Vaping Use: Never used   Substance and Sexual Activity    Alcohol use: Never    Drug use: Never    Sexual activity: Not on file   Other Topics Concern    Not on file   Social History Narrative    Not on file     Social Determinants of Health     Financial Resource Strain:     Difficulty of Paying Living Expenses:    Food Insecurity:     Worried About Running Out of Food in the Last Year:     [address] in the Last Year:    Transportation Needs:     Lack of Transportation (Medical):      Lack of Transportation (Non-Medical):    Physical Activity:     Days of Exercise per Week:     Minutes of Exercise per Session:    Stress:     Feeling of Stress : Social Connections:     Frequency of Communication with Friends and Family:     Frequency of Social Gatherings with Friends and Family:     Attends Orthodoxy Services:     Active Member of Clubs or Organizations:     Attends Club or Organization Meetings:     Marital Status:    Intimate Partner Violence:     Fear of Current or Ex-Partner:     Emotionally Abused:     Physically Abused:     Sexually Abused:        No Known Allergies    No current outpatient medications on file. No current facility-administered medications for this visit. Review of Systems   All other systems reviewed and are negative. /65 (BP 1 Location: Right arm, BP Patient Position: Sitting, BP Cuff Size: Adult)   Pulse 81   Temp 98.6 °F (37 °C) (Temporal)   Ht 5' 2\" (1.575 m)   Wt 204 lb 12.8 oz (92.9 kg)   SpO2 97%   BMI 37.46 kg/m²     Physical Exam  Skin:     Comments: His left axillary excision site is clean. In the midportion of the wound is open and granulating nicely. His sutures removed today in the office.          Problem List Items Addressed This Visit        Integumentary    Left axillary hidradenitis - Primary          Assessment and Plan:    Continue with local wound care with saline wet-to-dry dressings  Follow-up in my office in 2 weeks for wound check          Sweetie Tran MD

## 2021-09-15 ENCOUNTER — OFFICE VISIT (OUTPATIENT)
Dept: SURGERY | Age: 26
End: 2021-09-15
Payer: MEDICAID

## 2021-09-15 VITALS
HEART RATE: 95 BPM | HEIGHT: 62 IN | BODY MASS INDEX: 38.13 KG/M2 | OXYGEN SATURATION: 97 % | WEIGHT: 207.2 LBS | SYSTOLIC BLOOD PRESSURE: 101 MMHG | TEMPERATURE: 97.3 F | DIASTOLIC BLOOD PRESSURE: 85 MMHG

## 2021-09-15 DIAGNOSIS — L73.2 LEFT AXILLARY HIDRADENITIS: Primary | ICD-10-CM

## 2021-09-15 PROCEDURE — 99024 POSTOP FOLLOW-UP VISIT: CPT | Performed by: COLON & RECTAL SURGERY

## 2021-09-15 NOTE — PROGRESS NOTES
Chief Complaint   Patient presents with    Follow-up     3 weeks - HS     Visit Vitals  /85 (BP 1 Location: Left arm, BP Patient Position: Sitting, BP Cuff Size: Adult)   Pulse 95   Temp 97.3 °F (36.3 °C) (Temporal)   Ht 5' 2\" (1.575 m)   Wt 207 lb 3.2 oz (94 kg)   SpO2 97%   BMI 37.90 kg/m²

## 2021-09-20 ENCOUNTER — HOSPITAL ENCOUNTER (INPATIENT)
Age: 26
LOS: 4 days | Discharge: HOME | DRG: 751 | End: 2021-09-24
Attending: PSYCHIATRY & NEUROLOGY | Admitting: PSYCHIATRY & NEUROLOGY
Payer: MEDICAID

## 2021-09-20 DIAGNOSIS — R45.851 SUICIDAL IDEATION: Primary | ICD-10-CM

## 2021-09-20 PROBLEM — F32.A DEPRESSION: Status: ACTIVE | Noted: 2021-09-20

## 2021-09-20 LAB
ALBUMIN SERPL-MCNC: 3.5 G/DL (ref 3.5–5)
ALBUMIN/GLOB SERPL: 0.7 {RATIO} (ref 1.1–2.2)
ALP SERPL-CCNC: 103 U/L (ref 45–117)
ALT SERPL-CCNC: 35 U/L (ref 12–78)
AMPHET UR QL SCN: NEGATIVE
ANION GAP SERPL CALC-SCNC: 4 MMOL/L (ref 5–15)
APPEARANCE UR: CLEAR
AST SERPL W P-5'-P-CCNC: 27 U/L (ref 15–37)
BACTERIA URNS QL MICRO: NEGATIVE /HPF
BARBITURATES UR QL SCN: NEGATIVE
BASOPHILS # BLD: 0.1 K/UL (ref 0–0.1)
BASOPHILS NFR BLD: 1 % (ref 0–1)
BENZODIAZ UR QL: NEGATIVE
BILIRUB SERPL-MCNC: 0.5 MG/DL (ref 0.2–1)
BILIRUB UR QL: NEGATIVE
BUN SERPL-MCNC: 8 MG/DL (ref 6–20)
BUN/CREAT SERPL: 10 (ref 12–20)
CA-I BLD-MCNC: 9.1 MG/DL (ref 8.5–10.1)
CANNABINOIDS UR QL SCN: NEGATIVE
CHLORIDE SERPL-SCNC: 104 MMOL/L (ref 97–108)
CO2 SERPL-SCNC: 28 MMOL/L (ref 21–32)
COCAINE UR QL SCN: NEGATIVE
COLOR UR: NORMAL
COVID-19 RAPID TEST, COVR: NOT DETECTED
CREAT SERPL-MCNC: 0.78 MG/DL (ref 0.7–1.3)
DIFFERENTIAL METHOD BLD: NORMAL
DRUG SCRN COMMENT,DRGCM: NORMAL
EOSINOPHIL # BLD: 0 K/UL (ref 0–0.4)
EOSINOPHIL NFR BLD: 1 % (ref 0–7)
ERYTHROCYTE [DISTWIDTH] IN BLOOD BY AUTOMATED COUNT: 14.4 % (ref 11.5–14.5)
ETHANOL SERPL-MCNC: <4 MG/DL
GLOBULIN SER CALC-MCNC: 4.8 G/DL (ref 2–4)
GLUCOSE SERPL-MCNC: 74 MG/DL (ref 65–100)
GLUCOSE UR STRIP.AUTO-MCNC: NEGATIVE MG/DL
HCT VFR BLD AUTO: 42 % (ref 36.6–50.3)
HGB BLD-MCNC: 13.2 G/DL (ref 12.1–17)
HGB UR QL STRIP: NEGATIVE
IMM GRANULOCYTES # BLD AUTO: 0 K/UL (ref 0–0.04)
IMM GRANULOCYTES NFR BLD AUTO: 0 % (ref 0–0.5)
KETONES UR QL STRIP.AUTO: NEGATIVE MG/DL
LEUKOCYTE ESTERASE UR QL STRIP.AUTO: NEGATIVE
LYMPHOCYTES # BLD: 1.4 K/UL (ref 0.8–3.5)
LYMPHOCYTES NFR BLD: 27 % (ref 12–49)
MCH RBC QN AUTO: 26.9 PG (ref 26–34)
MCHC RBC AUTO-ENTMCNC: 31.4 G/DL (ref 30–36.5)
MCV RBC AUTO: 85.5 FL (ref 80–99)
METHADONE UR QL: NEGATIVE
MONOCYTES # BLD: 0.6 K/UL (ref 0–1)
MONOCYTES NFR BLD: 13 % (ref 5–13)
MUCOUS THREADS URNS QL MICRO: NORMAL /LPF
NEUTS SEG # BLD: 3 K/UL (ref 1.8–8)
NEUTS SEG NFR BLD: 58 % (ref 32–75)
NITRITE UR QL STRIP.AUTO: NEGATIVE
NRBC # BLD: 0 K/UL (ref 0–0.01)
NRBC BLD-RTO: 0 PER 100 WBC
OPIATES UR QL: NEGATIVE
PCP UR QL: NEGATIVE
PH UR STRIP: 6 [PH] (ref 5–8)
PLATELET # BLD AUTO: 320 K/UL (ref 150–400)
PMV BLD AUTO: 9.6 FL (ref 8.9–12.9)
POTASSIUM SERPL-SCNC: 3.8 MMOL/L (ref 3.5–5.1)
PROT SERPL-MCNC: 8.3 G/DL (ref 6.4–8.2)
PROT UR STRIP-MCNC: NEGATIVE MG/DL
RBC # BLD AUTO: 4.91 M/UL (ref 4.1–5.7)
RBC #/AREA URNS HPF: NORMAL /HPF (ref 0–5)
SODIUM SERPL-SCNC: 136 MMOL/L (ref 136–145)
SP GR UR REFRACTOMETRY: 1.02 (ref 1–1.03)
SPECIMEN SOURCE: NORMAL
UA: UC IF INDICATED,UAUC: NORMAL
UROBILINOGEN UR QL STRIP.AUTO: 0.1 EU/DL (ref 0.1–1)
WBC # BLD AUTO: 5 K/UL (ref 4.1–11.1)
WBC URNS QL MICRO: NORMAL /HPF (ref 0–4)

## 2021-09-20 PROCEDURE — 87635 SARS-COV-2 COVID-19 AMP PRB: CPT

## 2021-09-20 PROCEDURE — 80053 COMPREHEN METABOLIC PANEL: CPT

## 2021-09-20 PROCEDURE — 99284 EMERGENCY DEPT VISIT MOD MDM: CPT

## 2021-09-20 PROCEDURE — 82077 ASSAY SPEC XCP UR&BREATH IA: CPT

## 2021-09-20 PROCEDURE — 80307 DRUG TEST PRSMV CHEM ANLYZR: CPT

## 2021-09-20 PROCEDURE — 85025 COMPLETE CBC W/AUTO DIFF WBC: CPT

## 2021-09-20 PROCEDURE — 81001 URINALYSIS AUTO W/SCOPE: CPT

## 2021-09-20 PROCEDURE — 36415 COLL VENOUS BLD VENIPUNCTURE: CPT

## 2021-09-20 PROCEDURE — 65220000003 HC RM SEMIPRIVATE PSYCH

## 2021-09-20 RX ORDER — HYDROXYZINE 50 MG/1
50 TABLET, FILM COATED ORAL
Status: DISCONTINUED | OUTPATIENT
Start: 2021-09-20 | End: 2021-09-24 | Stop reason: HOSPADM

## 2021-09-20 RX ORDER — TRAZODONE HYDROCHLORIDE 50 MG/1
50 TABLET ORAL
Status: DISCONTINUED | OUTPATIENT
Start: 2021-09-20 | End: 2021-09-21 | Stop reason: SDUPTHER

## 2021-09-20 RX ORDER — MAG HYDROX/ALUMINUM HYD/SIMETH 200-200-20
30 SUSPENSION, ORAL (FINAL DOSE FORM) ORAL
Status: DISCONTINUED | OUTPATIENT
Start: 2021-09-20 | End: 2021-09-24 | Stop reason: HOSPADM

## 2021-09-20 RX ORDER — CITALOPRAM 20 MG/1
20 TABLET, FILM COATED ORAL DAILY
Status: DISCONTINUED | OUTPATIENT
Start: 2021-09-21 | End: 2021-09-24 | Stop reason: HOSPADM

## 2021-09-20 RX ORDER — ADHESIVE BANDAGE
30 BANDAGE TOPICAL DAILY PRN
Status: DISCONTINUED | OUTPATIENT
Start: 2021-09-20 | End: 2021-09-24 | Stop reason: HOSPADM

## 2021-09-20 RX ORDER — ACETAMINOPHEN 325 MG/1
650 TABLET ORAL
Status: DISCONTINUED | OUTPATIENT
Start: 2021-09-20 | End: 2021-09-21 | Stop reason: SDUPTHER

## 2021-09-20 NOTE — BSMART NOTE
Pt arrived at ED via private vehicle (self) and assessed in ED PWR    Pt presented with SI w/plan    Pt presented in his work uniform, well groomed, polite, cooperative, A&Ox4    Pt thought process shows no evidence of impairment    Pt cognition  appropriate decision making     Pt reports has never been hospitalized     Most Recent Hospitalizations if any: N/A    Pt reports no outpatient treatment    Pt does not have a hx of legal issues. Pt does not have hx of violence/aggression     Pt reports no substance use    Pt UDS positive for:     Hx. Of Substance Treatment: NO  When: Not Applicable  Where: Not Applicable    Access to Weapons: NO    If weapons, Have they been removed: N/A    Trauma Hx:   Pt denies any trauma hx    Family Support: YES    Who: family      Dr. Nils Treviño and Dr. Karissa Perdomo contacted and reports pt meets inpatient level of care and will be admitted to [address] [place] [address] pending medical clearance      This writer notified assigned RN Alvaro Gray and assigned physician . Safety Plan Completed: N/A        PATIENT NARRATIVE SUMMARY:  Pt presents to ED for SI. Pt works here in [employer]. Pt reports he has been having SI for \"awhile now\" and it has recently been getting worse and \"coming out more\". Pt reports SI w/ a plan to hang himself and pt reports he has been looking at knives often. Pt reports life stressors such as job, family and significant other relationship issues, people talking behind his bad. Pt rates his depression 20/10 and anxiety 12/10. Pt denies AVH. Pt reports sleep and appetite disturbance. Pt has no MH hx. Pt is willing to stay vol for tx. Pt to admit to  Room 232/1 upon medical clearance. Alvaro Gray RN notified of need for 1:1 due to SI. Pt memo for safety at this time. This writer will follow up as needed.

## 2021-09-20 NOTE — ED TRIAGE NOTES
[facility] pt stated that he has been having SI for a couple of months; pt stated that he has a lot on his mind; pt stated that he doesn't have a relationship with certain family members, his job has been stressing him out, along with relationship problems with his girlfriend; pt stated that he thought about cutting and hanging himself

## 2021-09-20 NOTE — ED PROVIDER NOTES
EMERGENCY DEPARTMENT HISTORY AND PHYSICAL EXAM      Date: 9/20/2021  Patient Name: Grant Russell    History of Presenting Illness     Chief Complaint   Patient presents with   [address] Problem       History Provided By: Patient    HPI: Grant Russell, 26 y.o. male with a past medical history significant obesity and Hidradenitis presents to the ED with cc of suicidal ideations. Patient states over the past month he has had frequent bouts of suicidal ideations. Patient states he did in the past have the same issue. Patient has tried to commit suicide in the past.  Patient has never been admitted for suicidal ideations in the past. Moderate severity, no known exacerbating or relieving factors, no other associated signs and symptoms. Patient specifically denies fever, chills, chest pain, shortness of breath, abdominal pain, nausea, vomiting, diarrhea. There are no other complaints, changes, or physical findings at this time. PCP: Anthony Arndt MD    No current facility-administered medications on file prior to encounter. No current outpatient medications on file prior to encounter. Past History     Past Medical History:  Past Medical History:   Diagnosis Date    Hydradenitis        Past Surgical History:  History reviewed. No pertinent surgical history. Family History:  Family History   Problem Relation Age of Onset    Hypertension Mother     Hypertension Father     Diabetes Maternal Aunt     Stroke Paternal Grandmother        Social History:  Social History     Tobacco Use    Smoking status: Never Smoker    Smokeless tobacco: Never Used   Vaping Use    Vaping Use: Never used   Substance Use Topics    Alcohol use: Never    Drug use: Never       Allergies:  No Known Allergies      Review of Systems     Review of Systems   Constitutional: Negative for chills and fever. HENT: Negative for dental problem and sore throat. Eyes: Negative for pain and visual disturbance. Respiratory: Negative for cough and chest tightness. Cardiovascular: Negative for chest pain. Gastrointestinal: Negative for diarrhea and nausea. Genitourinary: Negative for difficulty urinating and frequency. Musculoskeletal: Negative for gait problem and joint swelling. Neurological: Negative for numbness and headaches. Hematological: Negative for adenopathy. Does not bruise/bleed easily. Psychiatric/Behavioral: Positive for suicidal ideas. Negative for behavioral problems. Physical Exam     Physical Exam  Constitutional:       General: He is not in acute distress. Appearance: Normal appearance. He is not ill-appearing or toxic-appearing. HENT:      Head: Normocephalic and atraumatic. Nose: Nose normal.      Mouth/Throat:      Mouth: Mucous membranes are moist.   Eyes:      Extraocular Movements: Extraocular movements intact. Pupils: Pupils are equal, round, and reactive to light. Cardiovascular:      Rate and Rhythm: Normal rate and regular rhythm. Pulmonary:      Effort: Pulmonary effort is normal.      Breath sounds: Normal breath sounds. Abdominal:      General: Bowel sounds are normal.   Musculoskeletal:         General: Normal range of motion. Cervical back: Normal range of motion and neck supple. Skin:     General: Skin is warm and dry. Capillary Refill: Capillary refill takes less than 2 seconds. Neurological:      General: No focal deficit present. Mental Status: He is alert and oriented to person, place, and time. Psychiatric:         Mood and Affect: Mood is depressed. Behavior: Behavior is withdrawn. Thought Content: Thought content includes suicidal ideation. Thought content does not include homicidal ideation. Thought content does not include suicidal plan.          Lab and Diagnostic Study Results     Labs -     Recent Results (from the past 12 hour(s))   URINALYSIS W/ REFLEX CULTURE    Collection Time: 09/20/21  6:30 PM    Specimen: Urine   Result Value Ref Range    Color Yellow/Straw      Appearance Clear Clear      Specific gravity 1.018 1.003 - 1.030      pH (UA) 6.0 5.0 - 8.0      Protein Negative Negative mg/dL    Glucose Negative Negative mg/dL    Ketone Negative Negative mg/dL    Bilirubin Negative Negative      Blood Negative Negative      Urobilinogen 0.1 0.1 - 1.0 EU/dL    Nitrites Negative Negative      Leukocyte Esterase Negative Negative      UA:UC IF INDICATED Culture not indicated by UA result Culture not indicated by UA result      WBC 0-4 0 - 4 /hpf    RBC 0-5 0 - 5 /hpf    Bacteria Negative Negative /hpf    Mucus 1+ /lpf   DRUG SCREEN, URINE    Collection Time: 09/20/21  6:30 PM   Result Value Ref Range    AMPHETAMINES Negative Negative      BARBITURATES Negative Negative      BENZODIAZEPINES Negative Negative      COCAINE Negative Negative      METHADONE Negative Negative      OPIATES Negative Negative      PCP(PHENCYCLIDINE) Negative Negative      THC (TH-CANNABINOL) Negative Negative      Drug screen comment        This test is a screen for drugs of abuse in a medical setting only (i.e., they are unconfirmed results and as such must not be used for non-medical purposes, e.g.,employment testing, legal testing). Due to its inherent nature, false positive (FP) and false negative (FN) results may be obtained. Therefore, if necessary for medical care, recommend confirmation of positive findings by GC/MS.    CBC WITH AUTOMATED DIFF    Collection Time: 09/20/21  6:57 PM   Result Value Ref Range    WBC 5.0 4.1 - 11.1 K/uL    RBC 4.91 4.10 - 5.70 M/uL    HGB 13.2 12.1 - 17.0 g/dL    HCT 42.0 36.6 - 50.3 %    MCV 85.5 80.0 - 99.0 FL    MCH 26.9 26.0 - 34.0 PG    MCHC 31.4 30.0 - 36.5 g/dL    RDW 14.4 11.5 - 14.5 %    PLATELET 159 358 - 366 K/uL    MPV 9.6 8.9 - 12.9 FL    NRBC 0.0 0.0  WBC    ABSOLUTE NRBC 0.00 0.00 - 0.01 K/uL    NEUTROPHILS 58 32 - 75 %    LYMPHOCYTES 27 12 - 49 %    MONOCYTES 13 5 - 13 % EOSINOPHILS 1 0 - 7 %    BASOPHILS 1 0 - 1 %    IMMATURE GRANULOCYTES 0 0 - 0.5 %    ABS. NEUTROPHILS 3.0 1.8 - 8.0 K/UL    ABS. LYMPHOCYTES 1.4 0.8 - 3.5 K/UL    ABS. MONOCYTES 0.6 0.0 - 1.0 K/UL    ABS. EOSINOPHILS 0.0 0.0 - 0.4 K/UL    ABS. BASOPHILS 0.1 0.0 - 0.1 K/UL    ABS. IMM. GRANS. 0.0 0.00 - 0.04 K/UL    DF AUTOMATED     METABOLIC PANEL, COMPREHENSIVE    Collection Time: 09/20/21  6:57 PM   Result Value Ref Range    Sodium 136 136 - 145 mmol/L    Potassium 3.8 3.5 - 5.1 mmol/L    Chloride 104 97 - 108 mmol/L    CO2 28 21 - 32 mmol/L    Anion gap 4 (L) 5 - 15 mmol/L    Glucose 74 65 - 100 mg/dL    BUN 8 6 - 20 mg/dL    Creatinine 0.78 0.70 - 1.30 mg/dL    BUN/Creatinine ratio 10 (L) 12 - 20      GFR est AA >60 >60 ml/min/1.73m2    GFR est non-AA >60 >60 ml/min/1.73m2    Calcium 9.1 8.5 - 10.1 mg/dL    Bilirubin, total 0.5 0.2 - 1.0 mg/dL    AST (SGOT) 27 15 - 37 U/L    ALT (SGPT) 35 12 - 78 U/L    Alk. phosphatase 103 45 - 117 U/L    Protein, total 8.3 (H) 6.4 - 8.2 g/dL    Albumin 3.5 3.5 - 5.0 g/dL    Globulin 4.8 (H) 2.0 - 4.0 g/dL    A-G Ratio 0.7 (L) 1.1 - 2.2     ETHYL ALCOHOL    Collection Time: 09/20/21  6:57 PM   Result Value Ref Range    ALCOHOL(ETHYL),SERUM <4 <10 mg/dL   COVID-19 RAPID TEST    Collection Time: 09/20/21  7:15 PM   Result Value Ref Range    Specimen source Nasopharyngeal      COVID-19 rapid test Not Detected Not Detected         Radiologic Studies -   @lastxrresult@  CT Results  (Last 48 hours)    None        CXR Results  (Last 48 hours)    None            Medical Decision Making   - I am the first provider for this patient. - I reviewed the vital signs, available nursing notes, past medical history, past surgical history, family history and social history. - Initial assessment performed. The patients presenting problems have been discussed, and they are in agreement with the care plan formulated and outlined with them.   I have encouraged them to ask questions as they arise throughout their visit. Vital Signs-Reviewed the patient's vital signs. Patient Vitals for the past 12 hrs:   Temp Pulse Resp BP SpO2   09/20/21 2051 98.7 °F (37.1 °C) 76 17 108/67 100 %   09/20/21 1818 98.3 °F (36.8 °C) 82 16 125/73 100 %       Records Reviewed: Nursing Notes and Old Medical Records          ED Course:          Provider Notes (Medical Decision Making):   Patient presents with acute suicidal ideation. DDx:  2/2 MDD, schizoaffective d/o, bipolar, drug induced, organic cause such as electrolyte anomoly or infection. Stable vitals and benign exam. No obvious organic causes to explain behavior but will obtain psych labs, UA, UDS and speak with mental health professional about possible admission. Pt is currently voluntary. Sitter at bedside. Will continue to monitor while in ED  MDM       Procedures   Medical Decision Makingedical Decision Making  Performed by: John Small NP  PROCEDURES:  Procedures       Disposition   Disposition: Admitted to [address] [place] [address] at [facility] the case was discussed with the admitting physician     Admitted    DISCHARGE PLAN:  1. There are no discharge medications for this patient. 2.   Follow-up Information    None       3. Return to ED if worse   4. There are no discharge medications for this patient. Diagnosis     Clinical Impression:   1. Suicidal ideation        Attestations:    John Small NP    Please note that this dictation was completed with [employer], the computer voice recognition software. Quite often unanticipated grammatical, syntax, homophones, and other interpretive errors are inadvertently transcribed by the computer software. Please disregard these errors. Please excuse any errors that have escaped final proofreading. Thank you.

## 2021-09-20 NOTE — PROGRESS NOTES
OFFICE VISIT NOTE    Triston Green is a 26 y.o. male who presents to the office today for:    Chief Complaint   Patient presents with    Follow-up     3 weeks - hidradenitis       Mr. Uvaldo Soler comes in today for follow-up status post excision of left axillary hidradenitis July 23, 2021. He has been doing saline wet-to-dry dressing to the open wounds. He has no complaints today. He denies any significant drainage from the site. He denies any significant pain. Past Medical History:   Diagnosis Date    Hydradenitis        History reviewed. No pertinent surgical history. Family History   Problem Relation Age of Onset    Hypertension Mother     Hypertension Father     Diabetes Maternal Aunt     Stroke Paternal Grandmother        Social History     Socioeconomic History    Marital status: SINGLE     Spouse name: Not on file    Number of children: Not on file    Years of education: Not on file    Highest education level: Not on file   Occupational History    Not on file   Tobacco Use    Smoking status: Never Smoker    Smokeless tobacco: Never Used   Vaping Use    Vaping Use: Never used   Substance and Sexual Activity    Alcohol use: Never    Drug use: Never    Sexual activity: Not on file   Other Topics Concern    Not on file   Social History Narrative    Not on file     Social Determinants of Health     Financial Resource Strain:     Difficulty of Paying Living Expenses:    Food Insecurity:     Worried About Running Out of Food in the Last Year:     [address] in the Last Year:    Transportation Needs:     Lack of Transportation (Medical):      Lack of Transportation (Non-Medical):    Physical Activity:     Days of Exercise per Week:     Minutes of Exercise per Session:    Stress:     Feeling of Stress :    Social Connections:     Frequency of Communication with Friends and Family:     Frequency of Social Gatherings with Friends and Family:     Attends Buddhism Services:     Active Member of Clubs or Organizations:     Attends Club or Organization Meetings:     Marital Status:    Intimate Partner Violence:     Fear of Current or Ex-Partner:     Emotionally Abused:     Physically Abused:     Sexually Abused:        No Known Allergies    No current outpatient medications on file. No current facility-administered medications for this visit. Review of Systems   All other systems reviewed and are negative. /85 (BP 1 Location: Left arm, BP Patient Position: Sitting, BP Cuff Size: Adult)   Pulse 95   Temp 97.3 °F (36.3 °C) (Temporal)   Ht 5' 2\" (1.575 m)   Wt 207 lb 3.2 oz (94 kg)   SpO2 97%   BMI 37.90 kg/m²     Physical Exam  Skin:     Comments: Wounds in the left axilla are clean with granulation tissue present, no sinus tracts appreciated.          Problem List Items Addressed This Visit        Integumentary    Left axillary hidradenitis - Primary          Assessment and Plan:    Continue local wound care with saline wet-to-dry dressing twice daily  Follow-up 3 weeks for wound check          Jeannie Danielle MD

## 2021-09-21 PROBLEM — F32.2 DEPRESSION, MAJOR, SINGLE EPISODE, SEVERE (HCC): Status: ACTIVE | Noted: 2021-09-21

## 2021-09-21 PROCEDURE — 74011250637 HC RX REV CODE- 250/637: Performed by: PSYCHIATRY & NEUROLOGY

## 2021-09-21 PROCEDURE — 65220000003 HC RM SEMIPRIVATE PSYCH

## 2021-09-21 RX ORDER — TRAZODONE HYDROCHLORIDE 50 MG/1
50 TABLET ORAL
Status: DISCONTINUED | OUTPATIENT
Start: 2021-09-21 | End: 2021-09-24 | Stop reason: HOSPADM

## 2021-09-21 RX ORDER — DIPHENHYDRAMINE HYDROCHLORIDE 50 MG/ML
50 INJECTION, SOLUTION INTRAMUSCULAR; INTRAVENOUS
Status: DISCONTINUED | OUTPATIENT
Start: 2021-09-21 | End: 2021-09-24 | Stop reason: HOSPADM

## 2021-09-21 RX ORDER — OLANZAPINE 5 MG/1
5 TABLET ORAL
Status: DISCONTINUED | OUTPATIENT
Start: 2021-09-21 | End: 2021-09-24 | Stop reason: HOSPADM

## 2021-09-21 RX ORDER — HALOPERIDOL 5 MG/ML
5 INJECTION INTRAMUSCULAR
Status: DISCONTINUED | OUTPATIENT
Start: 2021-09-21 | End: 2021-09-24 | Stop reason: HOSPADM

## 2021-09-21 RX ORDER — BENZTROPINE MESYLATE 1 MG/1
1 TABLET ORAL
Status: DISCONTINUED | OUTPATIENT
Start: 2021-09-21 | End: 2021-09-24 | Stop reason: HOSPADM

## 2021-09-21 RX ORDER — LORAZEPAM 2 MG/ML
1 INJECTION INTRAMUSCULAR
Status: DISCONTINUED | OUTPATIENT
Start: 2021-09-21 | End: 2021-09-24 | Stop reason: HOSPADM

## 2021-09-21 RX ORDER — ADHESIVE BANDAGE
30 BANDAGE TOPICAL DAILY PRN
Status: DISCONTINUED | OUTPATIENT
Start: 2021-09-21 | End: 2021-09-24 | Stop reason: HOSPADM

## 2021-09-21 RX ORDER — ACETAMINOPHEN 325 MG/1
650 TABLET ORAL
Status: DISCONTINUED | OUTPATIENT
Start: 2021-09-21 | End: 2021-09-24 | Stop reason: HOSPADM

## 2021-09-21 RX ORDER — HYDROXYZINE 50 MG/1
50 TABLET, FILM COATED ORAL
Status: DISCONTINUED | OUTPATIENT
Start: 2021-09-21 | End: 2021-09-24 | Stop reason: HOSPADM

## 2021-09-21 NOTE — GROUP NOTE
IP  GROUP DOCUMENTATION INDIVIDUAL                                                                          Group Therapy Note    Date: 9/21/2021    Group Start Time: 0935  Group End Time: 3460  Group Topic: Education Group - Inpatient    SRM 2  NON ACUTE    Auther Shows    IP [facility] GROUP DOCUMENTATION GROUP    Group Therapy Note    Facilitated discussion focused on identifying feelings and their triggers and the changes that need to be made to experience more comfortable feelings and less uncomfortable feelings. Attendees: 7/10         Attendance: Attended    Patient's Goal:  Attend group daily     Interventions/techniques: Informed and Supported    Follows Directions: Followed directions    Interactions: Interacted appropriately    Mental Status: Calm    Behavior/appearance: Cooperative    Goals Achieved: Able to engage in interactions, Able to listen to others, Able to self-disclose, Discussed coping, Identified feelings and Identified triggers      Additional Notes:  Receptive to information and engaged. Pt was able to identify different feelings he experienced and its triggers.  Pt shared he was feeling\" depressed prior to admission because of  \"job and family\" and shared he currently feel \"calm\" because he is here getting help\" Pt shared in order to feel more  comfortable feelings he nee to \"express his feelings instead of holding them in\"      Peru Rakes

## 2021-09-21 NOTE — BH NOTES
JUVENAL    Pt signed JUVENAL's for    Work supervisor Luana Meade - [phone] - only to notify of admission and to send work letter    Bushra Chilel - mom [phone] - entire record    Laine Sanford - Girlfriend [phone] - entire record     Writer called Luana Meade and notified Luana Meade of admission to hospital.    Writer also called mom and girlfriend to notify them.

## 2021-09-21 NOTE — BH NOTES
PSA PART II ADDITIONAL INFORMATION        Access To Fire [employer]: No    Substance Use: NO    Last Use: N/A    Type of Substance: no substance use    Frequency of Use: N/A    Request to See : NO    If yes, notified : NO    Release of Information Signed: YES    Release of Information Signed For: JUVENAL signed for rupinder Miller)
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
## 2021-09-21 NOTE — GROUP NOTE
IP  GROUP DOCUMENTATION INDIVIDUAL                                                                          Group Therapy Note    Date: 9/21/2021    Group Start Time: 1100  Group End Time: 1200  Group Topic: Process Group - Inpatient    SRM CARE MANAGEMENT    Jose Hanson    IP [facility] GROUP DOCUMENTATION GROUP    Group Therapy Note  The focus of the group was the subject of anger and stress management. Group members were encouraged to examine the ways anger and stress have adversely effected their lives. Group members were taught various skills to use when handling conflict. Group members participated in role plays to sample possible situations they face when managing stress. Group members were then  directed to share and explore methods and strategies for remaining calm in the outpatient setting. Attendees: 9         Attendance: Attended    Patient's Goal:  Manage anxiety    Interventions/techniques: Challenged, Informed, Validated, Promoted peer support, Provide feedback, Reinforced, Role playing and Supported    Follows Directions: Followed directions    Interactions: Interacted appropriately    Mental Status: Calm, Congruent and Happy    Behavior/appearance: Attentive, Caretaking, Cooperative, Enthusiastic and Neatly groomed    Goals Achieved: Able to engage in interactions, Able to listen to others, Able to give feedback to another, Able to reflect/comment on own behavior, Able to receive feedback, Able to self-disclose, Discussed coping, Displayed empathy and Identified feelings      Additional Notes:  Pt encouraged other participants to rely on their Quaker belief as a way of helping them cope with external stressors.       Melinda Pro

## 2021-09-21 NOTE — GROUP NOTE
IP  GROUP DOCUMENTATION INDIVIDUAL                                                                          Group Therapy Note    Date: 9/21/2021    Group Start Time: 1330  Group End Time: 7231  Group Topic: Recreational/Music Therapy    SRM 2  NON ACUTE    Sindy Aquino    IP [facility] GROUP DOCUMENTATION GROUP    Group Therapy Note    Facilitated leisure skills group to reinforce positive coping through music, social interaction, group activities and arts/crafts    Attendees: 9/10         Attendance: Attended    Patient's Goal:  Attend group daily     Interventions/techniques: Art integration and Supported    Follows Directions: Followed directions    Interactions: Interacted appropriately    Mental Status: Calm    Behavior/appearance: Cooperative    Goals Achieved: Able to engage in interactions and Able to listen to others      Additional Notes:  Receptive to listening to music while working on leisure task. Interacted with peers and staff when prompted.  Pt pulled out of group to meet with the  doctor    TODD Coelho

## 2021-09-21 NOTE — ED NOTES
TRANSFER - OUT REPORT:    Verbal report given to Coleman RN (name) on [facility]  being transferred to (unit) for routine progression of care       Report consisted of patients Situation, Background, Assessment and   Recommendations(SBAR). Information from the following report(s) SBAR and ED Summary was reviewed with the receiving nurse. *Pt not in green gown as pt was never placed in an ED ROOM. Pt in triage chairs/brantley during ED stay. Coleman RN aware. Lines:       Opportunity for questions and clarification was provided.       Patient transported with:   Gilberto Tipton

## 2021-09-21 NOTE — BH NOTES
Client is pleasant and cooperative. Alert and oriented x 3. Client has a good appetite and reports sleepping well. Denies feeling suicidal or homicidal.Continues to have high anxiety and feeling depressed. Attending scheduled groups and unit activities. Remains on close observation for safety.
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
## 2021-09-22 LAB
ALBUMIN SERPL-MCNC: 2.9 G/DL (ref 3.5–5)
ALBUMIN/GLOB SERPL: 0.7 {RATIO} (ref 1.1–2.2)
ALP SERPL-CCNC: 89 U/L (ref 45–117)
ALT SERPL-CCNC: 25 U/L (ref 12–78)
ANION GAP SERPL CALC-SCNC: 7 MMOL/L (ref 5–15)
AST SERPL W P-5'-P-CCNC: 23 U/L (ref 15–37)
BILIRUB SERPL-MCNC: 0.2 MG/DL (ref 0.2–1)
BUN SERPL-MCNC: 12 MG/DL (ref 6–20)
BUN/CREAT SERPL: 17 (ref 12–20)
CA-I BLD-MCNC: 8.3 MG/DL (ref 8.5–10.1)
CHLORIDE SERPL-SCNC: 107 MMOL/L (ref 97–108)
CHOLEST SERPL-MCNC: 152 MG/DL
CO2 SERPL-SCNC: 25 MMOL/L (ref 21–32)
CREAT SERPL-MCNC: 0.69 MG/DL (ref 0.7–1.3)
ERYTHROCYTE [DISTWIDTH] IN BLOOD BY AUTOMATED COUNT: 14.1 % (ref 11.5–14.5)
GLOBULIN SER CALC-MCNC: 4.3 G/DL (ref 2–4)
GLUCOSE SERPL-MCNC: 89 MG/DL (ref 65–100)
HCT VFR BLD AUTO: 40.9 % (ref 36.6–50.3)
HDLC SERPL-MCNC: 46 MG/DL
HDLC SERPL: 3.3 {RATIO} (ref 0–5)
HGB BLD-MCNC: 12.9 G/DL (ref 12.1–17)
LDLC SERPL CALC-MCNC: 93.2 MG/DL (ref 0–100)
LIPID PROFILE,FLP: NORMAL
MCH RBC QN AUTO: 26.5 PG (ref 26–34)
MCHC RBC AUTO-ENTMCNC: 31.5 G/DL (ref 30–36.5)
MCV RBC AUTO: 84 FL (ref 80–99)
NRBC # BLD: 0 K/UL (ref 0–0.01)
NRBC BLD-RTO: 0 PER 100 WBC
PLATELET # BLD AUTO: 321 K/UL (ref 150–400)
PMV BLD AUTO: 9.5 FL (ref 8.9–12.9)
POTASSIUM SERPL-SCNC: 3.8 MMOL/L (ref 3.5–5.1)
PROT SERPL-MCNC: 7.2 G/DL (ref 6.4–8.2)
RBC # BLD AUTO: 4.87 M/UL (ref 4.1–5.7)
SODIUM SERPL-SCNC: 139 MMOL/L (ref 136–145)
TRIGL SERPL-MCNC: 64 MG/DL (ref ?–150)
TSH SERPL DL<=0.05 MIU/L-ACNC: 1.22 UIU/ML (ref 0.36–3.74)
VLDLC SERPL CALC-MCNC: 12.8 MG/DL
WBC # BLD AUTO: 4.6 K/UL (ref 4.1–11.1)

## 2021-09-22 PROCEDURE — 74011250637 HC RX REV CODE- 250/637: Performed by: PSYCHIATRY & NEUROLOGY

## 2021-09-22 PROCEDURE — 84443 ASSAY THYROID STIM HORMONE: CPT

## 2021-09-22 PROCEDURE — 85027 COMPLETE CBC AUTOMATED: CPT

## 2021-09-22 PROCEDURE — 80053 COMPREHEN METABOLIC PANEL: CPT

## 2021-09-22 PROCEDURE — 65220000003 HC RM SEMIPRIVATE PSYCH

## 2021-09-22 PROCEDURE — 80061 LIPID PANEL: CPT

## 2021-09-22 PROCEDURE — 36415 COLL VENOUS BLD VENIPUNCTURE: CPT

## 2021-09-22 RX ADMIN — CITALOPRAM HYDROBROMIDE 20 MG: 20 TABLET ORAL at 08:47

## 2021-09-22 NOTE — GROUP NOTE
[facility] GROUP DOCUMENTATION INDIVIDUAL                                                                          Group Therapy Note    Date: 9/22/2021    Group Start Time: 6838  Group End Time: 4573  Group Topic: Education Group - Inpatient    SRM CARE MANAGEMENT    Dusty Michelle    IP [facility] [facility] GROUP DOCUMENTATION GROUP    Group Therapy Note  Discussed safety plans as a group. Pts identified warning signs, coping mechanisms, and social supports to assist pts when they are discharged. Attendees: 6/11         Attendance: Attended    Patient's Goal:  Pt reported his goal is to get a car and a 's license by the end of the year. Interventions/techniques: Challenged, Informed, Validated and Supported    Follows Directions: Followed directions    Interactions: Interacted appropriately    Mental Status: Calm, Congruent and Flat    Behavior/appearance: Cooperative and Withdrawn/quiet    Goals Achieved: Discussed coping, Discussed discharge plans and Discussed safety plan      Additional Notes: Pt was cooperative and respectful. Pt asked appropriate questions and received feedback. Pt was able to identify warning signs and coping mechanisms.      Arlinda Nissen

## 2021-09-22 NOTE — CONSULTS
Consult Date: 9/22/2021    Chief Complaint: No complaint  Chief Complaint   Patient presents with    Mental Health Problem       HPI: HPI patient is a 26years old -American man who is been admitted here for psychiatry evaluation and treatment he had suicidal thoughts and he was depressed. He denies any history of cough fever or chills. No history of chest pain or shortness of breath. No history of nausea vomiting diarrhea abdominal pain or black stool. No history of increased frequency of micturition or painful micturition. No history of any joint pain or joint swelling. No history of headache or dizziness. No history of loss of consciousness or seizures. No history of change in vision no history of change in appetite or weight.   ROS:ROS   Constitutional: Negative  HEENT: Negative  CVS: Negative  RS: Negative  GI: Negative  : Negative  Musculoskeletal: Negative  Immunology: Records are not available  Neurology: Negative  Endocrine: Negative  Haem-Onc: Negative  Skin: Negative  Psychiatry: Depression  Allergies  No Known Allergies  FAMILY HISTORY:  Family History   Problem Relation Age of Onset    Hypertension Mother     Hypertension Father     Diabetes Maternal Aunt     Stroke Paternal Grandmother      SOCIAL HISTORY:  Social History     Socioeconomic History    Marital status: SINGLE     Spouse name: Not on file    Number of children: Not on file    Years of education: Not on file    Highest education level: Not on file   Occupational History    Not on file   Tobacco Use    Smoking status: Never Smoker    Smokeless tobacco: Never Used   Vaping Use    Vaping Use: Never used   Substance and Sexual Activity    Alcohol use: Never    Drug use: Never    Sexual activity: Yes     Partners: Female     Birth control/protection: Condom   Other Topics Concern    Not on file   Social History Narrative    Not on file     Social Determinants of Health     Financial Resource Strain:    Mary Nevarez Difficulty of Paying Living Expenses:    Food Insecurity:     Worried About Running Out of Food in the Last Year:     [address] in the Last Year:    Transportation Needs:     Lack of Transportation (Medical):  Lack of Transportation (Non-Medical):    Physical Activity:     Days of Exercise per Week:     Minutes of Exercise per Session:    Stress:     Feeling of Stress :    Social Connections:     Frequency of Communication with Friends and Family:     Frequency of Social Gatherings with Friends and Family:     Attends Yarsani Services:     Active Member of Clubs or Organizations:     Attends Club or Organization Meetings:     Marital Status:    Intimate Partner Violence:     Fear of Current or Ex-Partner:     Emotionally Abused:     Physically Abused:     Sexually Abused:    Non-smoker no history of alcohol or drug abuse    SURGICAL HISTORY:  History reviewed. No pertinent surgical history.   Removal of abscesses because of hydradenitis  PMH:  Past Medical History:   Diagnosis Date    Hydradenitis      Home Medications   Prior to Admission medications    Not on File     Vitals:  Patient Vitals for the past 24 hrs:   Temp Pulse Resp BP SpO2   09/22/21 0824 97.8 °F (36.6 °C) 73 18 (!) 93/54 --   09/21/21 1920 97.8 °F (36.6 °C) 89 18 (!) 96/55 100 %        General Examination: Physical Exam patient is a 26years old -American man moderately obese not in any acute distress alert awake oriented x3  HEENT: Normocephalic atraumatic skull pupils are bilaterally equal reactive to light sclera nonicteric conjunctive are pink no edema of the eyelids no ear or nasal discharge tongue is pink no ulcer positive gag reflex no pharyngeal inflammation extraocular movements are intact  Neck: Supple full range of motion no JVD no HJR no lymphadenopathy no thyromegaly no carotid bruit  Chest: Expands well no localized swelling or tenderness  RS: Clear breath sounds no rhonchi no rales  CVS: S1-S2 audible regular no murmur gallop or pericardial rub  Abdomen: Obese bowel sounds are positive no organomegaly no tenderness  Extremeties: No edema no clubbing no cyanosis peripheral pulses 2  CNS: Grossly unremarkable cranial nerves I to XII are individually checked and they are intact  Muscle power 5/5  Reflexes 2+  Plantars downgoing  Romberg sign is negative  Finger-to-nose test is negative          LABS: All labs are reviewed negative    CXR Results  (Last 48 hours)    None          No results found for this or any previous visit (from the past 12 hour(s)). No orders to display        ASSESSMENT/PLAN: Obesity  History of hydradenitis  Depression  Advised patient to keep his legs leg clean and not to shave it. This will prevent number of hydradenitis attacks less.   Also advised him to diet and exercise  He is medically stable for psychiatry evaluation and treatment he can participate in all activities without any reservations        5:36 PM, 09/22/21  Mercy Ballard MD

## 2021-09-22 NOTE — BH NOTES
Patient has been attending groups. He states he woke up early this morning a said his prayers and his day has bee better. He denies active suicidal or homicidal ideations, denies auditory or visual hallucinations or delusions. He is pleasant. States you can ask me anything you want to today. I am going to try and have a positive attitude which will help me make good decisions. Patient given positive reinforcement for his positive outlook today.

## 2021-09-22 NOTE — H&P
[facility]  PSYCH HISTORY AND PHYSICAL    Name:  Juan Fitzgerald  MR#:  [id number]  :  1995  ACCOUNT #:  [de-identified]  ADMIT DATE:  2021    This is a 27-year-old, single, -American male patient, admitted to [facility] Unit voluntarily from [facility] ED, where he presented with a chief complaint of suicidal thoughts for a couple of months, a lot going on, and he felt like cutting or hanging himself. HISTORY OF PRESENT ILLNESS:  The patient is currently not getting any help, and he says the stress has been with the family. He is staying with some cousins and relation with the family is stressful. Work stress where he feels like he was working there for three years, but they give sometime multiple tasks to do at the same time. Also, some difficulty with the girlfriend of one year. He says his appetite is okay, sleep decreased, energy and motivation low, concentration and focus low, not getting any help. PAST HISTORY:  No prior psychiatric treatment, inpatient or outpatient. TRAUMA HISTORY:  Denied. ALLERGIES TO MEDICATIONS:  NO KNOWN ALLERGIES TO MEDICATIONS. CURRENT MEDICATIONS:  None. SOCIAL HISTORY:  Denies any alcohol abuse, nicotine abuse, drug abuse. He has got two years' of degree of technical college, I believe, in culinary service, he is a . He has got a girlfriend. He would like to have a car and have a house and get  and have a family. FAMILY HISTORY:  He thinks some cousins have depression. MEDICAL HISTORY:  He has left-sided hidradenitis, seen a surgeon before. MENTAL STATUS EXAMINATION:  Average height, medium-built male patient, alert, verbal, polite, cooperative, talked in a low soft tone of voice, flat affect, constricted, depressed. Acknowledged depression with a suicidal thought and plan to cut himself and hang himself, but feeling better. He would like to not do that here. Memory recall is fair.   IQ about average. Oriented to all the three spheres and context. No hallucination. No delusions. Reduced psychomotor activity, also reduced productivity of verbal expression. Willing to tell the staff if he is afraid of losing control. PHYSICAL EXAMINATION:  VITAL SIGNS:  Today, temperature 97.7, pulse 85, blood pressure 99/57, and respirations 18. LABORATORY DATA:  Urinalysis unremarkable. Drug screen negative. CBC unremarkable. Metabolic panel unremarkable. Liver functions are normal.  Ethyl alcohol level less than 4. COVID not detected. DIAGNOSIS:  Major depression, single episode, acute, severe, without psychosis. DISPOSITION:  The patient needs inpatient level of care as he is having suicidal thoughts, plans to cut himself or hang himself, close observation, medical consult and start him on antidepressant such as Celexa 20 mg daily, trazodone 50 mg p.r.n., Tylenol, Mylanta, and Vistaril p.r.n. Individual therapy, group therapy, learning coping skills, stress management, and medical consult. LENGTH OF STAY:  Five to seven days. CRITERIA FOR DISCHARGE:  Free of suicidal thoughts, improved coping, and stable neurovegetative functioning.     FOLLOWUP:  Follow up with local psychiatrist.      Tiera Fatima MD      [id number]  D:  09/21/2021 15:37  T:  09/21/2021 19:54  JOB #:  [id number]

## 2021-09-22 NOTE — PROGRESS NOTES
Problem: General Medical Care Plan  Goal: *Progressive mobility and function (eg: ADL's)  Outcome: Progressing Towards Goal     Problem: Depressed Mood (Adult/Pediatric)  Goal: *STG: Participates in treatment plan  Outcome: Progressing Towards Goal     Problem: Depressed Mood (Adult/Pediatric)  Goal: *LTG: Understands illness and can identify signs of relapse  Outcome: Progressing Towards Goal

## 2021-09-22 NOTE — PROGRESS NOTES
Problem: Depressed Mood (Adult/Pediatric)  Goal: *STG: Participates in treatment plan  Outcome: Progressing Towards Goal  Goal: *STG: Attends activities and groups  Outcome: Progressing Towards Goal

## 2021-09-22 NOTE — BH NOTES
Patient has been visible on the unit. Socializing with others in the day room and watching television. He alert & oriented X 4. He denies depression, anxiety, SI, AH & VH. His affect is bright. Offered PRN but he refused. He remains on close observation for safety.

## 2021-09-22 NOTE — BH NOTES
Behavioral Health Treatment Team Note     Patient goal(s) for today: to continue to get to know myself and others  Treatment team focus/goals: to continue medication management and group therapy    Progress note:  pt presented with a broad affect and congruent mood. Pt denied AHV's and SI/HI. Pt shared that he is an outgoing person and that he has been feeling better since he has the chance to speak with others that can relate to the same thing he is going through. The pt spoke positively about his roommate and the treatment he has been receiving. The pt reported he is eating and sleeping well and is starting to learn and get to know himself better. An inpatient level of care is still necessary for symptom management. LOS:  2  Expected LOS: 5-7 days    Insurance info/prescription coverage:  [facility] / [facility]  Date of last family contact:  None made yet  Family requesting physician contact today:  no  Discharge plan:  Therapist exploring options  Guns in the home:  no   Outpatient provider(s):   Will be coordinated prior to d/c    Participating treatment team members: Gilda Moreno, * (assigned SW), ANDREZ Jimenez intern

## 2021-09-22 NOTE — GROUP NOTE
[facility]  GROUP DOCUMENTATION INDIVIDUAL                                                                          Group Therapy Note    Date: 9/22/2021    Group Start Time: 0932  Group End Time: 1000  Group Topic: [employer] Meeting    [facility] [address] [facility] GROUP DOCUMENTATION GROUP    Group Therapy Note    Attendees:          Attendance: Attended    Patient's Goal:  Patient stated mood is depressed, goal is to get in touch with family members. Interventions/techniques: Supported    Follows Directions:  Followed directions    Interactions: Interacted appropriately    Mental Status: Calm    Behavior/appearance: Attentive    Goals Achieved: Able to engage in interactions      Additional Notes:      Mariluz Calvert

## 2021-09-22 NOTE — GROUP NOTE
IP  GROUP DOCUMENTATION INDIVIDUAL                                                                          Group Therapy Note    Date: 9/22/2021    Group Start Time: 1115  Group End Time: 1200  Group Topic: Process Group - Inpatient    [facility] CARE MANAGEMENT    Sherif Song    IP 1150 [facility] GROUP DOCUMENTATION GROUP    Group Therapy Note    Patients were asked how they are feeling as a check-in question. Writer then facilitated an open and guided discussion about anger. Pts were encouraged to self-reflect and share coping strategies or other helpful things they know that help them deal with their anger. Attendees: 6/9         Attendance: Attended    Patient's Goal:  Pt responded to check-in that he was feeling okay    Interventions/techniques: Informed, Validated and Supported    Follows Directions: Followed directions    Interactions: Interacted appropriately    Mental Status: Calm and Congruent    Behavior/appearance: Attentive and Cooperative    Goals Achieved: Able to engage in interactions, Able to listen to others, Able to give feedback to another, Able to reflect/comment on own behavior and Able to manage/cope with feelings      Additional Notes:   Pt was attentive during group. Pt was polite and respectful to peers. Pt participated in discussion and was able to self-disclose.  Pt shared groups have helped him identify his feelings and triggers melba Stephens

## 2021-09-23 PROCEDURE — 74011250637 HC RX REV CODE- 250/637: Performed by: PSYCHIATRY & NEUROLOGY

## 2021-09-23 PROCEDURE — 65220000003 HC RM SEMIPRIVATE PSYCH

## 2021-09-23 RX ADMIN — CITALOPRAM HYDROBROMIDE 20 MG: 20 TABLET ORAL at 08:25

## 2021-09-23 NOTE — GROUP NOTE
CARLENE  GROUP DOCUMENTATION INDIVIDUAL                                                                          Group Therapy Note    Date: 9/23/2021    Group Start Time: 1115  Group End Time: 1200  Group Topic: Process Group - Inpatient    [facility] CARE MANAGEMENT    Chantal Gonzales    IP 1150 [facility] GROUP DOCUMENTATION GROUP    Group Therapy Note  Patients were asked to reflect on their goals for the future, things they have started but not yet completed, and things they have already accomplished in life. Patients provided skills that are neccessary for each of the three categories. Patients were encouraged to provide peer support and feedback. Attendees: 8/10         Attendance: Attended    Patient's Goal: Pt stated his goal is to open up to people more and talk to people more while he is at the hospital.     Interventions/techniques: Informed, Validated, Promoted peer support, Provide feedback and Supported    Follows Directions: Followed directions    Interactions: Interacted appropriately    Mental Status: Calm, Congruent and Happy    Behavior/appearance: Attentive, Caretaking, Cooperative and Motivated    Goals Achieved: Able to engage in interactions, Able to listen to others, Able to give feedback to another, Able to reflect/comment on own behavior, Able to manage/cope with feelings, Able to receive feedback and Able to self-disclose      Additional Notes: Pt presented with calm and motivated affect and congruent mood. Pt had insight into his own accomplishments and his future goals. Pt was able to self-disclose and provide peer support. Pt was active in trying to provide feedback to the rest of the group members.      Nisha Waller

## 2021-09-23 NOTE — BH NOTES
Patient visible in dayroom on phone attempting to contact girlfriend. Denies suicidal ideation or thoughts of self harm. Denies homicidal ideation. Denies hallucinations. Denies anxiety or depression. Medication complaint.  Will continue to monitor for safety

## 2021-09-23 NOTE — GROUP NOTE
IP  GROUP DOCUMENTATION INDIVIDUAL                                                                          Group Therapy Note    Date: 9/23/2021    Group Start Time: 0940  Group End Time: 1020  Group Topic: Education Group - Inpatient    [facility] 2 BH NON ACUTE    Ruddy Commerce    IP  GROUP DOCUMENTATION GROUP    Group Therapy Note    Facilitated discussion focused on defining different types of defense mechanisms and giving examples to help recognize the defense that was used to cope with stress and anxiety    Attendees: 7/10         Attendance: Attended    Patient's Goal:  Attend group daily     Interventions/techniques: Informed and Supported    Follows Directions:  Followed directions    Interactions: Interacted appropriately    Mental Status: Calm    Behavior/appearance: Cooperative    Goals Achieved: Able to engage in interactions, Able to listen to others, Able to self-disclose, Discussed coping and Discussed self-esteem issues      Additional Notes: Receptive to information discussed and was able to share a personal  example of defenses he used prior to admission such as \"acting out, denial and devaluation\" relating to [place] suicidal thoughts, depression, and thinking he is failure and not good enough\"    Carli Marshall

## 2021-09-23 NOTE — BH NOTES
Dx: Depression w/SI    Affect full. Pleasant. Denied feeling depressed, anxious, and/or suicidal. Consumed 100% of bfkt, in the solarium, with others; social. Appears clean, in appearance; however, has not attended to hygiene, this shift. Encouraged to attend groups. Q 15 mins checks maintained, for safety.

## 2021-09-23 NOTE — GROUP NOTE
IP  GROUP DOCUMENTATION INDIVIDUAL                                                                          Group Therapy Note    Date: 9/23/2021    Group Start Time: 1330  Group End Time: 0596  Group Topic: Recreational/Music Therapy    SR[facility] 2  NON ACUTE    Gabriel Uribe    IP 1150 [facility] GROUP DOCUMENTATION GROUP    Group Therapy Note    Facilitated leisure skills group to reinforce positive coping through music, social interaction, group activities and arts/crafts    Attendees: 4/10         Attendance: Attended    Patient's Goal:  Attend group daily     Interventions/techniques: Art integration and Supported    Follows Directions: Followed directions    Interactions: Interacted appropriately    Mental Status: Calm    Behavior/appearance: Cooperative    Goals Achieved: Able to engage in interactions and Able to listen to others      Additional Notes:  Receptive to listening to music and songs he selected while working on leisure task. Interacted with peers and staff.      Artemio Reed, CTRS

## 2021-09-23 NOTE — BH NOTES
Behavioral Health Treatment Team Note     Patient goal(s) for today: to go to music group  Treatment team focus/goals: to continue medication management and group therapy    Progress note: pt presented with a broad affect and congruent mood. Pt denied SI/HI and AHV's. Pt and writer had a conversation about treatment process and discharge planning. The pt shared he needs 2 work letters one for [employer], the other for [address] [place] [address]. The pt shared he misses his girlfriend and his colleagues. Pt is very outgoing and enjoys other people's company. The pt shared he has learned a lot about himself and really enjoys the music groups here. The pt was very polite with writer. Pt was asked if he would like outpatient services, and the pt declined. An inpatient level of care is still necessary for discharge planning. LOS:  3  Expected LOS: 4-5 days    Insurance info/prescription coverage:  [facility] Medicaid/ [facility]  Date of last family contact:  n/a  Family requesting physician contact today:  no  Discharge plan:  D/c home with outpatient resources  Guns in the home:  no   Outpatient provider(s):   Will be coordinated prior to d/c    Participating treatment team members: Yoana Varela, * (assigned SW), ANDREZ York intern

## 2021-09-23 NOTE — GROUP NOTE
IP  GROUP DOCUMENTATION INDIVIDUAL                                                                          Group Therapy Note    Date: 9/22/2021    Group Start Time: 1815  Group End Time: 1900  Group Topic: Recreational/Music Therapy    SRM 2  NON ACUTE    Kenia Marcus    IP 1150 [facility] GROUP DOCUMENTATION GROUP    Group Therapy Note    Attendees: 7/8    Facilitated structured group to introduce Mindfulness and Meditation as positive way to cope and manage daily stressors. Introduced relaxation technique using Mandalas to practice relaxation in group. Attendance: Attended    Patient's Goal:STG: Attends activities and groups      Interventions/techniques: Art integration and Supported    Follows Directions: Followed directions    Interactions: Interacted appropriately    Mental Status: Calm and Flat    Behavior/appearance: Attentive and Cooperative    Goals Achieved: Able to engage in interactions and Able to listen to others      Additional Notes: Attended group and was receptive to intervention. Received material provided in group and was attentive during group discussion. Participated in relaxation technique of listening to instrumentals and coloring mandalas. Verbalized enjoyment of group. Attended entire session.      Fabiana Perdue

## 2021-09-24 VITALS
RESPIRATION RATE: 18 BRPM | HEART RATE: 82 BPM | DIASTOLIC BLOOD PRESSURE: 67 MMHG | HEIGHT: 62 IN | WEIGHT: 20 LBS | BODY MASS INDEX: 3.68 KG/M2 | TEMPERATURE: 97.7 F | OXYGEN SATURATION: 98 % | SYSTOLIC BLOOD PRESSURE: 117 MMHG

## 2021-09-24 PROCEDURE — 74011250637 HC RX REV CODE- 250/637: Performed by: PSYCHIATRY & NEUROLOGY

## 2021-09-24 RX ORDER — TRAZODONE HYDROCHLORIDE 50 MG/1
50 TABLET ORAL
Qty: 30 TABLET | Refills: 0 | Status: SHIPPED | OUTPATIENT
Start: 2021-09-24 | End: 2022-08-08

## 2021-09-24 RX ORDER — CITALOPRAM 20 MG/1
20 TABLET, FILM COATED ORAL DAILY
Qty: 30 TABLET | Refills: 0 | Status: SHIPPED | OUTPATIENT
Start: 2021-09-25 | End: 2022-08-08

## 2021-09-24 RX ADMIN — CITALOPRAM HYDROBROMIDE 20 MG: 20 TABLET ORAL at 08:14

## 2021-09-24 NOTE — BH NOTES
DISCHARGE SUMMARY    Darby Garner  : 1995  MRN: [id number]    The patient Cindy Kunkle exhibits the ability to control behavior in a less restrictive environment. Patient's level of functioning is improving. No assaultive/destructive behavior has been observed for the past 24 hours. No suicidal/homicidal threat or behavior has been observed for the past 24 hours. There is no evidence of serious medication side effects. Patient has not been in physical or protective restraints for at least the past 24 hours. If weapons involved, how are they secured? n/a    Is patient aware of and in agreement with discharge plan? yes    Arrangements for medication:  Prescriptions given to patient, given a weeks supply or 30 day supply. Copy of discharge instructions to provider?:  yes    Arrangements for transportation home:  Patient will take the public transportation bus to his house. Keep all follow up appointments as scheduled, continue to take prescribed medications per physician instructions.     Mental health crisis number:  621 or your local mental health crisis line number at [address] [facility] WARM LINE      1-914-730-MHAV (8214)      M-F: 9am to 9pm      Sat & Sun: 5pm - 9pm  National suicide prevention lines:                             5-983-IMLYZRH (0-702-583-6659)       1-641-896-TALK (1-247-907-591-982-0130)    Crisis Text Line:  Text HOME to 345733  n/a

## 2021-09-24 NOTE — BH NOTES
Patient discharged at this time to self. Patient denied suicidal/homicidal ideation, hallucinations or delusions. Patient is alert and oriented X4. Patient was given work releases notes for his jobs. Patient hopeful for the future and states he came to the hospital and received the help that he needed. Verbalized understanding of all discharge instruction, medications and appointments. Was given an opportunity to ask questions.

## 2021-09-24 NOTE — GROUP NOTE
IP  GROUP DOCUMENTATION INDIVIDUAL                                                                          Group Therapy Note    Date: 9/24/2021    Group Start Time: 1115  Group End Time: 1200  Group Topic: Process Group - Inpatient    [facility] CARE MANAGEMENT    Madonna Juan    IP 1150 [facility] GROUP DOCUMENTATION GROUP    Group Therapy Note  Pts were encouraged to share stressors in their lives while the rest of the group came up with potential coping mechanisms the individual could use to cope with their stressor. The individual was encouraged to pick and discuss the best coping mechanism that works for them individually. The group discussed skills involved in creating coping mechanisms. Attendees: 5/9         Attendance: Attended    Patient's Goal:  Pt responded to check in question and stated that he has self-determination. Interventions/techniques: Challenged, Informed, Validated, Promoted peer support, Provide feedback and Supported    Follows Directions: Followed directions    Interactions: Interacted appropriately    Mental Status: Calm, Congruent and Happy    Behavior/appearance: Attentive, Caretaking, Cooperative and Motivated    Goals Achieved: Able to engage in interactions, Able to listen to others, Able to give feedback to another, Able to reflect/comment on own behavior, Able to manage/cope with feelings, Able to receive feedback and Able to self-disclose      Additional Notes: Pt presented with bright and motivated affect and congruent mood. Pt was cooperative and respectful. Pt assisted to motivate other group members. Pt provided group feedback and was able to self-disclose about his own stressors in his life.      Carlito Reyna

## 2021-09-24 NOTE — GROUP NOTE
CARLENE  GROUP DOCUMENTATION INDIVIDUAL                                                                          Group Therapy Note    Date: 9/24/2021    Group Start Time: 0912  Group End Time: 0945  Group Topic: Community Meeting    [facility] [address] [facility] GROUP DOCUMENTATION GROUP    Group Therapy Note    Attendees: 8         Attendance: Attended    Patient's Goal:  Patient stated mood is wonderful, goal is to get to know self. Interventions/techniques: Supported    Follows Directions: Followed directions    Interactions: Interacted appropriately    Mental Status: Calm    Behavior/appearance: Attentive    Goals Achieved: Able to engage in interactions      Additional Notes:  Patient participated in group activity.     Kristin Rouse

## 2021-09-24 NOTE — BH NOTES
Behavioral Health Transition Record to Provider    Patient Name: Tessa Landau  YOB: 1995  Medical Record Number: [id number]  Date of Admission: 9/20/2021  Date of Discharge: 9/24/2021    Attending Provider: Adonis Gambino MD  Discharging Provider: Adonis Gambino MD  To contact this individual call [phone] and ask the  to page. If unavailable, ask to be transferred to [address] [place] [address] Provider on call. [facility] Provider will be available on call 24/7 and during holidays. Primary Care Provider: Moisés Lam MD    No Known Allergies    Reason for Admission: Patient was admitted for symptoms of depression due to life stressors and suicide ideation. Admission Diagnosis: Suicidal ideation [R45.851]  Depression [F32.9]  Depression, major, single episode, severe ([facility] [address] [F32.2]    * No surgery found *    Results for orders placed or performed during the hospital encounter of 09/20/21   COVID-19 RAPID TEST   Result Value Ref Range    Specimen source Nasopharyngeal      COVID-19 rapid test Not Detected Not Detected     CBC WITH AUTOMATED DIFF   Result Value Ref Range    WBC 5.0 4.1 - 11.1 K/uL    RBC 4.91 4.10 - 5.70 M/uL    HGB 13.2 12.1 - 17.0 g/dL    HCT 42.0 36.6 - 50.3 %    MCV 85.5 80.0 - 99.0 FL    MCH 26.9 26.0 - 34.0 PG    MCHC 31.4 30.0 - 36.5 g/dL    RDW 14.4 11.5 - 14.5 %    PLATELET 803 821 - 323 K/uL    MPV 9.6 8.9 - 12.9 FL    NRBC 0.0 0.0  WBC    ABSOLUTE NRBC 0.00 0.00 - 0.01 K/uL    NEUTROPHILS 58 32 - 75 %    LYMPHOCYTES 27 12 - 49 %    MONOCYTES 13 5 - 13 %    EOSINOPHILS 1 0 - 7 %    BASOPHILS 1 0 - 1 %    IMMATURE GRANULOCYTES 0 0 - 0.5 %    ABS. NEUTROPHILS 3.0 1.8 - 8.0 K/UL    ABS. LYMPHOCYTES 1.4 0.8 - 3.5 K/UL    ABS. MONOCYTES 0.6 0.0 - 1.0 K/UL    ABS. EOSINOPHILS 0.0 0.0 - 0.4 K/UL    ABS. BASOPHILS 0.1 0.0 - 0.1 K/UL    ABS. IMM.  GRANS. 0.0 0.00 - 0.04 K/UL    DF AUTOMATED     METABOLIC PANEL, COMPREHENSIVE   Result Value Ref Range    Sodium 136 136 - 145 mmol/L    Potassium 3.8 3.5 - 5.1 mmol/L    Chloride 104 97 - 108 mmol/L    CO2 28 21 - 32 mmol/L    Anion gap 4 (L) 5 - 15 mmol/L    Glucose 74 65 - 100 mg/dL    BUN 8 6 - 20 mg/dL    Creatinine 0.78 0.70 - 1.30 mg/dL    BUN/Creatinine ratio 10 (L) 12 - 20      GFR est AA >60 >60 ml/min/1.73m2    GFR est non-AA >60 >60 ml/min/1.73m2    Calcium 9.1 8.5 - 10.1 mg/dL    Bilirubin, total 0.5 0.2 - 1.0 mg/dL    AST (SGOT) 27 15 - 37 U/L    ALT (SGPT) 35 12 - 78 U/L    Alk. phosphatase 103 45 - 117 U/L    Protein, total 8.3 (H) 6.4 - 8.2 g/dL    Albumin 3.5 3.5 - 5.0 g/dL    Globulin 4.8 (H) 2.0 - 4.0 g/dL    A-G Ratio 0.7 (L) 1.1 - 2.2     URINALYSIS W/ REFLEX CULTURE    Specimen: Urine   Result Value Ref Range    Color Yellow/Straw      Appearance Clear Clear      Specific gravity 1.018 1.003 - 1.030      pH (UA) 6.0 5.0 - 8.0      Protein Negative Negative mg/dL    Glucose Negative Negative mg/dL    Ketone Negative Negative mg/dL    Bilirubin Negative Negative      Blood Negative Negative      Urobilinogen 0.1 0.1 - 1.0 EU/dL    Nitrites Negative Negative      Leukocyte Esterase Negative Negative      UA:UC IF INDICATED Culture not indicated by UA result Culture not indicated by UA result      WBC 0-4 0 - 4 /hpf    RBC 0-5 0 - 5 /hpf    Bacteria Negative Negative /hpf    Mucus 1+ /lpf   DRUG SCREEN, URINE   Result Value Ref Range    AMPHETAMINES Negative Negative      BARBITURATES Negative Negative      BENZODIAZEPINES Negative Negative      COCAINE Negative Negative      METHADONE Negative Negative      OPIATES Negative Negative      PCP(PHENCYCLIDINE) Negative Negative      THC (TH-CANNABINOL) Negative Negative      Drug screen comment        This test is a screen for drugs of abuse in a medical setting only (i.e., they are unconfirmed results and as such must not be used for non-medical purposes, e.g.,employment testing, legal testing).  Due to its inherent nature, false positive (FP) and false negative (FN) results may be obtained. Therefore, if necessary for medical care, recommend confirmation of positive findings by GC/MS. ETHYL ALCOHOL   Result Value Ref Range    ALCOHOL(ETHYL),SERUM <4 <70 mg/dL   METABOLIC PANEL, COMPREHENSIVE   Result Value Ref Range    Sodium 139 136 - 145 mmol/L    Potassium 3.8 3.5 - 5.1 mmol/L    Chloride 107 97 - 108 mmol/L    CO2 25 21 - 32 mmol/L    Anion gap 7 5 - 15 mmol/L    Glucose 89 65 - 100 mg/dL    BUN 12 6 - 20 mg/dL    Creatinine 0.69 (L) 0.70 - 1.30 mg/dL    BUN/Creatinine ratio 17 12 - 20      GFR est AA >60 >60 ml/min/1.73m2    GFR est non-AA >60 >60 ml/min/1.73m2    Calcium 8.3 (L) 8.5 - 10.1 mg/dL    Bilirubin, total 0.2 0.2 - 1.0 mg/dL    AST (SGOT) 23 15 - 37 U/L    ALT (SGPT) 25 12 - 78 U/L    Alk. phosphatase 89 45 - 117 U/L    Protein, total 7.2 6.4 - 8.2 g/dL    Albumin 2.9 (L) 3.5 - 5.0 g/dL    Globulin 4.3 (H) 2.0 - 4.0 g/dL    A-G Ratio 0.7 (L) 1.1 - 2.2     CBC W/O DIFF   Result Value Ref Range    WBC 4.6 4.1 - 11.1 K/uL    RBC 4.87 4.10 - 5.70 M/uL    HGB 12.9 12.1 - 17.0 g/dL    HCT 40.9 36.6 - 50.3 %    MCV 84.0 80.0 - 99.0 FL    MCH 26.5 26.0 - 34.0 PG    MCHC 31.5 30.0 - 36.5 g/dL    RDW 14.1 11.5 - 14.5 %    PLATELET 093 675 - 554 K/uL    MPV 9.5 8.9 - 12.9 FL    NRBC 0.0 0.0  WBC    ABSOLUTE NRBC 0.00 0.00 - 0.01 K/uL   TSH 3RD GENERATION   Result Value Ref Range    TSH 1.22 0.36 - 3.74 uIU/mL   LIPID PANEL   Result Value Ref Range    LIPID PROFILE        Cholesterol, total 152 <200 mg/dL    Triglyceride 64 <150 mg/dL    HDL Cholesterol 46 mg/dL    LDL, calculated 93.2 0 - 100 mg/dL    VLDL, calculated 12.8 mg/dL    CHOL/HDL Ratio 3.3 0.0 - 5.0         Immunizations administered during this encounter: There is no immunization history on file for this patient.     Screening for Metabolic Disorders for Patients on Antipsychotic Medications  (Data obtained from the EMR)    Estimated Body Mass Index  Estimated body mass index is 3.66 kg/m² as calculated from the following:    Height as of this encounter: 5' 2\" (1.575 m). Weight as of this encounter: 9.072 kg (20 lb). Vital Signs/Blood Pressure  Visit Vitals  /67   Pulse 82   Temp 97.7 °F (36.5 °C)   Resp 18   Ht 5' 2\" (1.575 m)   Wt 9.072 kg (20 lb)   SpO2 98%   BMI 3.66 kg/m²       Blood Glucose/Hemoglobin A1c  Lab Results   Component Value Date/Time    Glucose 89 09/22/2021 05:23 AM       No results found for: HBA1C, [id number]     Lipid Panel  Lab Results   Component Value Date/Time    Cholesterol, total 152 09/22/2021 05:23 AM    HDL Cholesterol 46 09/22/2021 05:23 AM    LDL, calculated 93.2 09/22/2021 05:23 AM    Triglyceride 64 09/22/2021 05:23 AM    CHOL/HDL Ratio 3.3 09/22/2021 05:23 AM        Discharge Diagnosis: Suicidal ideation [R45.851]  Depression [F32.9]  Depression, major, single episode, severe ([facility] [address] [F32.2]    Discharge Plan: Patient will return home with outpatient resources ([facility] to utilize on an as-needed basis. Discharge Medication List and Instructions: There are no discharge medications for this patient. Unresulted Labs (24h ago, onward)    None        To obtain results of studies pending at discharge, please contact [phone]    Follow-up Information     Follow up With Specialties Details Why [facility]  Service Board  Call today For continued care Call [phone] ext [phone] to get connected with mental health services    [facility]  Call today For continued care Call [phone] to arrange outpatient psychiatric services    [email or web address]   For continued care Search for therapists. Can filter by location, insurance, gender, type of therapy    [place] [address]  Call They offer outpatient therapy, medication management, and case management           Advanced Directive:   Does the patient have an appointed surrogate decision maker? No  Does the patient have a Medical Advance Directive? No  Does the patient have a Psychiatric Advance Directive? No  If the patient does not have a surrogate or Medical Advance Directive AND Psychiatric Advance Directive, the patient was offered information on these advance directives Patient will complete at a later time    Patient Instructions: Please continue all medications until otherwise directed by physician. Tobacco Cessation Discharge Plan:   Is the patient a smoker and needs referral for smoking cessation? Not applicable  Patient referred to the following for smoking cessation with an appointment? Not applicable     Patient was offered medication to assist with smoking cessation at discharge? Not applicable  Was education for smoking cessation added to the discharge instructions? Not applicable    Alcohol/Substance Abuse Discharge Plan:   Does the patient have a history of substance/alcohol abuse and requires a referral for treatment? No  Patient referred to the following for substance/alcohol abuse treatment with an appointment? Not applicable  Patient was offered medication to assist with alcohol cessation at discharge? Not applicable  Was education for substance/alcohol abuse added to discharge instructions?  Not applicable    Patient discharged to Home

## 2021-09-24 NOTE — GROUP NOTE
IP  GROUP DOCUMENTATION INDIVIDUAL                                                                          Group Therapy Note    Date: 9/24/2021    Group Start Time: 0950  Group End Time: 1376  Group Topic: Education Group - Inpatient    SRM 2  NON ACUTE    Sharron Duran    IP [address] [facility] GROUP DOCUMENTATION GROUP    Group Therapy Note    Facilitated group to introduce information on thinking and stress to help understand the role our thoughts play in managing stress and strategies to challenge alarming thoughts    Attendees: 9/11         Attendance: Attended    Patient's Goal:  Attend group daily     Interventions/techniques: Informed and Supported    Follows Directions: Followed directions    Interactions: Interacted appropriately    Mental Status: Calm    Behavior/appearance: Cooperative    Goals Achieved: Able to engage in interactions, Able to listen to others and Able to self-disclose      Additional Notes:  Receptive to information discussed on accurate versus distorted beliefs and different types of alarming thoughts. Pt was able to recognize examples of different thoughts and challenge them.  Pt shared prior to admission he was thinking negative    Elfrcarol ann Ferrer, [address]

## 2021-09-24 NOTE — BH NOTES
Patient visible in dayroom. Denies suicidal ideation or thoughts of self harm. Denies homicidal ideation. Denies hallucinations. Denies anxiety or depression. No HS medication.  Will continue to monitor for safety

## 2021-09-24 NOTE — BH NOTES
ED patient seen for follow-up discussed in the treatment team patient participating the groups polite pleasant oriented although 3 spheres today he was saying he is eager to go home felt much improved able to be here and talking out of his chest and recognizing that people can hear and helping also that he is not along the loss people with other problems to. He would like to go for outpatient treatment and encouraged patient to stay further to benefit from the mobile therapist group therapies but also given outpatient referral medication work. Staff noted him to be socializing with the peers personal hygiene grooming is fair.   Sleeping well eating well taking his citalopram reassess the case tomorrow continued inpatient level of care indicated his blood pressure 90/60 no dizziness no drowsiness no sedation no unsteadiness noted oriented although 3 spell the context they are asking the  to have a couple of I believe he has FMLA paperwork for his work thank you end of dictation

## 2021-09-24 NOTE — SUICIDE SAFETY PLAN
SAFETY PLAN    A suicide Safety Plan is a document that supports someone when they are having thoughts of suicide. Warning Signs that indicate a suicidal crisis may be developing: What (situations, thoughts, feelings, body sensations, behaviors, etc.) do you experience that lets you know you are beginning to think about suicide? 1. shaking  2. Not talking to anybody  3. Self-isolation    Internal Coping Strategies:  What things can I do (relaxation techniques, hobbies, physical activities, etc.) to take my mind off my problems without contacting another person? 1. Listen to music  2. Talk to my girlfriend  3. Walk around    People and social settings that provide distraction: Who can I call or where can I go to distract me? 1. Name: Rj Casiano  Phone: [phone]   2. Name: Erindon Gibbs  Phone: [phone]   3. Place: [facility] in [address] [facility]:     People whom I can ask for help: Who can I call when I need help - for example, friends, family, clergy, someone else? 1. Name: Mother                Phone: in-phone  2. Name: Rj Casiano  Phone: [phone]  3. Name: Erin Faust  Phone: in-phone    Professionals or [address] [place] [address] I can contact during a crisis: Who can I call for help - for example, my doctor, my psychiatrist, my psychologist, a mental health provider, a suicide hotline? 1. Clinician Name: [facility]   Phone: [phone]      Clinician Pager or Emergency Contact #: [phone]    5. Clinician Name: Mary Vilchis   Phone: [phone]      Clinician Pager or Emergency Contact #: [phone]    6. Suicide Prevention Lifeline: 3-772-249-TALK (6098)    4.  [address] [place] [address] Emergency Services -  for example, [facility] suicide hotline, [facility] Hotline: [address] [facility]      Emergency Services Address: [facility]. [address] [place], [place] [address]      Emergency Services Phone: [phone]    Making the environment safe: How can I make my environment (house/apartment/living space) safer? For example, can I remove guns, medications, and other items? 1. Getting of rid of toxic people  2.  Walking around the neighborhood

## 2021-09-26 NOTE — DISCHARGE SUMMARY
Joshua Mc 23 SUMMARY    Name:  Erin Hand  MR#:  [id number]  :  1995  ACCOUNT #:  [de-identified]  ADMIT DATE:  2021  DISCHARGE DATE:  2021    Please make reference to my initial psychiatric H and P. This is a 23-year-old single [facility] male patient admitted to behavioral health unit voluntarily from [facility] ED where he was presented with a chief complaint of suicidal thoughts for a couple of months. A lot going on at work, with the family, and he felt like cutting himself or hanging himself. He was not getting help, and the stress was building up. At work, he was given more and more responsibilities. His appetite was okay. Sleep decreased. Energy and motivation decreased. Felt anxious and depressed. Because of fear of harming himself, close observation initiated after he was admitted. Participated in individual therapy, group therapy, learning coping skills, stress management and taking psychiatric medication, and he started feeling better, felt the idea of sharing with somebody that he is not alone and verbalizing took quite a bit of stress away. He is pushing for discharge since yesterday. The patient participated in individual therapy, group therapy, slept well, ate well. No more suicidal thoughts. No more hallucination. Bright affect. Good energy. Good motivation. Oriented to all the 3 spheres. Slept well. Ate well. He is being discharged. DISCHARGE MEDICATIONS:  Citalopram 20 mg daily, trazodone 50 mg p.r.n. LABORATORY DATA:  Urinalysis unremarkable. Drug screen negative. CBC unremarkable. Metabolic panel unremarkable including the liver functions. Ethyl alcohol less than 4. COVID not detected. TSH 1.22. Lipid panel unremarkable. The patient participated in individual therapy, group therapy, medication management, leisure skill management, and he received maximum benefit. Not suicidal anymore.   Willing to go for outpatient treatment and to comply with medications. No guns, no weapons. Continue outpatient treatment. DISCHARGE DIAGNOSES:  Major depression, single episode, acute, severe, without psychosis, suicidal ideation. DISCHARGE MEDICATIONS:  Citalopram 20 mg daily and trazodone 50 mg p.r.n. DISPOSITION:  The patient is discharged. Not suicidal.  Not homicidal.  Not psychotic. Discharged as improved. Stable neurovegetative functioning. Hopeful.       Kevin Lemos MD      [id number]  D:  09/25/2021 1:25  T:  09/26/2021 0:59  JOB #:  [id number]

## 2021-10-04 NOTE — ANESTHESIA POSTPROCEDURE EVALUATION
Procedure(s):  EXCISION LEFT AXILLARY HYDRADENITIS SUPPURATIVA (URGENT).     general    Anesthesia Post Evaluation      Multimodal analgesia: multimodal analgesia not used between 6 hours prior to anesthesia start to PACU discharge  Patient location during evaluation: PACU  Patient participation: complete - patient participated  Level of consciousness: awake and alert  Pain score: 0  Pain management: adequate  Airway patency: patent  Anesthetic complications: no  Cardiovascular status: acceptable, blood pressure returned to baseline and hemodynamically stable  Respiratory status: acceptable, nonlabored ventilation, spontaneous ventilation, room air and unassisted  Hydration status: acceptable  Post anesthesia nausea and vomiting:  none  Final Post Anesthesia Temperature Assessment:  Normothermia (36.0-37.5 degrees C)      INITIAL Post-op Vital signs:   Vitals Value Taken Time   /82 07/23/21 1444   Temp 36.2 °C (97.1 °F) 07/23/21 1345   Pulse 69 07/23/21 1444   Resp 11 07/23/21 1444   SpO2 97 % 07/23/21 1444

## 2021-10-20 ENCOUNTER — OFFICE VISIT (OUTPATIENT)
Dept: SURGERY | Age: 26
End: 2021-10-20
Payer: MEDICAID

## 2021-10-20 VITALS
HEART RATE: 91 BPM | DIASTOLIC BLOOD PRESSURE: 59 MMHG | TEMPERATURE: 99.1 F | OXYGEN SATURATION: 98 % | WEIGHT: 216 LBS | SYSTOLIC BLOOD PRESSURE: 103 MMHG | HEIGHT: 62 IN | BODY MASS INDEX: 39.75 KG/M2

## 2021-10-20 DIAGNOSIS — L73.2 LEFT AXILLARY HIDRADENITIS: Primary | ICD-10-CM

## 2021-10-20 PROCEDURE — 99024 POSTOP FOLLOW-UP VISIT: CPT | Performed by: COLON & RECTAL SURGERY

## 2021-10-20 NOTE — PROGRESS NOTES
Chief Complaint   Patient presents with    Follow-up     left axillary     Visit Vitals  BP (!) 103/59 (BP 1 Location: Left arm, BP Patient Position: Sitting, BP Cuff Size: Adult)   Pulse 91   Temp 99.1 °F (37.3 °C) (Temporal)   Ht 5' 2\" (1.575 m)   Wt 216 lb (98 kg)   SpO2 98%   BMI 39.51 kg/m²

## 2021-10-23 NOTE — PROGRESS NOTES
OFFICE VISIT NOTE    Alvina Ho is a 23 y.o. male who presents to the office today for:    Chief Complaint   Patient presents with    Follow-up     left axillary       MrParis Israel comes in today for follow-up status post excision of left axillary hidradenitis on July 23, 2021. Given the extent of his lymphadenitis he did end up with an open wound in the midportion of his incision site. He has been doing saline wet-to-dry dressings. Today he has no complaints. Denies any drainage from the site. He denies any pain. Past Medical History:   Diagnosis Date    Hydradenitis        History reviewed. No pertinent surgical history. Family History   Problem Relation Age of Onset    Hypertension Mother     Hypertension Father     Diabetes Maternal Aunt     Stroke Paternal Grandmother        Social History     Socioeconomic History    Marital status: SINGLE     Spouse name: Not on file    Number of children: Not on file    Years of education: Not on file    Highest education level: Not on file   Occupational History    Not on file   Tobacco Use    Smoking status: Never Smoker    Smokeless tobacco: Never Used   Vaping Use    Vaping Use: Never used   Substance and Sexual Activity    Alcohol use: Never    Drug use: Never    Sexual activity: Yes     Partners: Female     Birth control/protection: Condom   Other Topics Concern    Not on file   Social History Narrative    Not on file     Social Determinants of Health     Financial Resource Strain:     Difficulty of Paying Living Expenses:    Food Insecurity:     Worried About Running Out of Food in the Last Year:     [address] in the Last Year:    Transportation Needs:     Lack of Transportation (Medical):      Lack of Transportation (Non-Medical):    Physical Activity:     Days of Exercise per Week:     Minutes of Exercise per Session:    Stress:     Feeling of Stress :    Social Connections:     Frequency of Communication with Friends and Family:     Frequency of Social Gatherings with Friends and Family:     Attends Anabaptism Services:     Active Member of Clubs or Organizations:     Attends Club or Organization Meetings:     Marital Status:    Intimate Partner Violence:     Fear of Current or Ex-Partner:     Emotionally Abused:     Physically Abused:     Sexually Abused:        No Known Allergies    Current Outpatient Medications   Medication Sig    citalopram (CELEXA) 20 mg tablet Take 1 Tablet by mouth daily. Indications: anxiousness associated with depression    traZODone (DESYREL) 50 mg tablet Take 1 Tablet by mouth nightly as needed for Sleep (For insomnia). No current facility-administered medications for this visit. Review of Systems   All other systems reviewed and are negative. BP (!) 103/59 (BP 1 Location: Left arm, BP Patient Position: Sitting, BP Cuff Size: Adult)   Pulse 91   Temp 99.1 °F (37.3 °C) (Temporal)   Ht 5' 2\" (1.575 m)   Wt 216 lb (98 kg)   SpO2 98%   BMI 39.51 kg/m²     Physical Exam  Skin:     Comments: His left axillary excision site is clean. There is 2 small open wounds which have clean granulation tissue and a flush with the skin. There is good ingrowth of skin edges. There is no evidence of recurrent hidradenitis. Problem List Items Addressed This Visit        Integumentary    Left axillary hidradenitis - Primary          Assessment and Plan:    Continue local wound care with saline wet-to-dry  Follow-up my office 2 weeks  Anticipate he will heal his surgical site in the next 2 to 4 weeks and at that time we can discuss elective surgery for the right side.           Jena Mckeon MD

## 2021-11-23 ENCOUNTER — OFFICE VISIT (OUTPATIENT)
Dept: SURGERY | Age: 26
End: 2021-11-23
Payer: MEDICAID

## 2021-11-23 VITALS
HEART RATE: 88 BPM | DIASTOLIC BLOOD PRESSURE: 58 MMHG | HEIGHT: 62 IN | BODY MASS INDEX: 39.75 KG/M2 | RESPIRATION RATE: 16 BRPM | TEMPERATURE: 98.4 F | SYSTOLIC BLOOD PRESSURE: 92 MMHG | WEIGHT: 216 LBS | OXYGEN SATURATION: 98 %

## 2021-11-23 DIAGNOSIS — L73.2 AXILLARY HIDRADENITIS SUPPURATIVA: Primary | ICD-10-CM

## 2021-11-23 PROCEDURE — 99213 OFFICE O/P EST LOW 20 MIN: CPT | Performed by: COLON & RECTAL SURGERY

## 2021-11-23 NOTE — PROGRESS NOTES
Chief Complaint   Patient presents with    Follow-up     Left Arm      Visit Vitals  BP (!) 92/58 (BP 1 Location: Left arm, BP Patient Position: Sitting)   Pulse 88   Temp 98.4 °F (36.9 °C) (Temporal)   Resp 16   Ht 5' 2\" (1.575 m)   Wt 216 lb (98 kg)   SpO2 98%   BMI 39.51 kg/m²     1. Have you been to the ER, urgent care clinic since your last visit? Hospitalized since your last visit? No    2. Have you seen or consulted any other health care providers outside of the [address] [place] [address] since your last visit? Include any pap smears or colon screening.  No

## 2021-12-13 ENCOUNTER — HOSPITAL ENCOUNTER (EMERGENCY)
Age: 26
Discharge: HOME | End: 2021-12-13
Attending: STUDENT IN AN ORGANIZED HEALTH CARE EDUCATION/TRAINING PROGRAM
Payer: MEDICAID

## 2021-12-13 VITALS
BODY MASS INDEX: 38.28 KG/M2 | SYSTOLIC BLOOD PRESSURE: 135 MMHG | DIASTOLIC BLOOD PRESSURE: 72 MMHG | HEIGHT: 62 IN | HEART RATE: 101 BPM | OXYGEN SATURATION: 100 % | TEMPERATURE: 98.2 F | RESPIRATION RATE: 16 BRPM | WEIGHT: 208 LBS

## 2021-12-13 DIAGNOSIS — U07.1 COVID: ICD-10-CM

## 2021-12-13 DIAGNOSIS — Z20.822 EXPOSURE TO COVID-19 VIRUS: Primary | ICD-10-CM

## 2021-12-13 LAB
SARS-COV-2, COV2: DETECTED
SARS-COV-2, COV2: NORMAL

## 2021-12-13 PROCEDURE — 87635 SARS-COV-2 COVID-19 AMP PRB: CPT

## 2021-12-13 PROCEDURE — 99283 EMERGENCY DEPT VISIT LOW MDM: CPT

## 2021-12-13 NOTE — DISCHARGE INSTRUCTIONS
Thank you! Thank you for allowing me to care for you in the emergency department. I sincerely hope that you are satisfied with your visit today. It is my goal to provide you with excellent care. Below you will find a list of your labs and imaging from your visit today. Should you have any questions regarding these results please do not hesitate to call the emergency department. Labs -     Recent Results (from the past 12 hour(s))   SARS-COV-2    Collection Time: 12/13/21  6:22 AM   Result Value Ref Range    SARS-CoV-2 Please find results under separate order     SARS-COV-2    Collection Time: 12/13/21  6:22 AM   Result Value Ref Range    SARS-CoV-2 DETECTED (A) Not Detected         Radiologic Studies -   No orders to display     CT Results  (Last 48 hours)      None          CXR Results  (Last 48 hours)      None               If you feel that you have not received excellent quality care or timely care, please ask to speak to the nurse manager. Please choose us in the future for your continued health care needs. ------------------------------------------------------------------------------------------------------------  The exam and treatment you received in the Emergency Department were for an urgent problem and are not intended as complete care. It is important that you follow-up with a doctor, nurse practitioner, or physician assistant to:  (1) confirm your diagnosis,  (2) re-evaluation of changes in your illness and treatment, and  (3) for ongoing care. If your symptoms become worse or you do not improve as expected and you are unable to reach your usual health care provider, you should return to the Emergency Department. We are available 24 hours a day. Please take your discharge instructions with you when you go to your follow-up appointment. If you have any problem arranging a follow-up appointment, contact the Emergency Department immediately.     If a prescription has been provided, please have it filled as soon as possible to prevent a delay in treatment. Read the entire medication instruction sheet provided to you by the pharmacy. If you have any questions or reservations about taking the medication due to side effects or interactions with other medications, please call your primary care physician or contact the ER to speak with the charge nurse. Make an appointment with your family doctor or the physician you were referred to for follow-up of this visit as instructed on your discharge paperwork, as this is a mandatory follow-up. Return to the ER if you are unable to be seen or if you are unable to be seen in a timely manner. If you have any problem arranging the follow-up visit, contact the Emergency Department immediately.

## 2021-12-13 NOTE — ED TRIAGE NOTES
Pt states that he was exposed to someone with covid, was on isolation for 14 days now needs a covid test done so he can take covid negative results back to his place of work to be allowed to work

## 2021-12-13 NOTE — Clinical Note
[address] [facility] EMERGENCY DEPT  [address] [place], [place] [address]  [phone]    Work/School Note    Date: 12/13/2021     To Whom It May concern:    Macario Saldana was evaluated by the following provider(s):  Attending Provider: Mervin Robertson MD  Physician: Lindsey Betancourt MD.   [address] virus is suspected. Per the CDC guidelines we recommend home isolation until the following conditions are all met:    1. At least 10 days have passed since symptoms first appeared and  2. At least 24 hours have passed since last fever without the use of fever-reducing medications and  3.  Symptoms (e.g., cough, shortness of breath) have improved      Sincerely,          Hal Tompkins MD

## 2021-12-13 NOTE — ED PROVIDER NOTES
EMERGENCY DEPARTMENT HISTORY AND PHYSICAL EXAM      Date: 12/13/2021  Patient Name: Lex Vazquez    History of Presenting Illness     Chief Complaint   Patient presents with    Request Records       History Provided By: Patient    HPI: Lex Vazquez, 23 y.o. male with a past medical history significant No significant past medical history presents to the ED with cc of requesting Covid test. 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 Patient came to emergency department for test. Patient denies any symptoms, denies any fevers chills, no cough, no chest pain, no shortness of breath. There are no other complaints, changes, or physical findings at this time. PCP: Jad Burrell MD    No current facility-administered medications on file prior to encounter. Current Outpatient Medications on File Prior to Encounter   Medication Sig Dispense Refill    citalopram (CELEXA) 20 mg tablet Take 1 Tablet by mouth daily. Indications: anxiousness associated with depression 30 Tablet 0    traZODone (DESYREL) 50 mg tablet Take 1 Tablet by mouth nightly as needed for Sleep (For insomnia). 30 Tablet 0       Past History     Past Medical History:  Past Medical History:   Diagnosis Date    Hydradenitis        Past Surgical History:  No past surgical history on file.     Family History:  Family History   Problem Relation Age of Onset    Hypertension Mother     Hypertension Father     Diabetes Maternal Aunt     Stroke Paternal Grandmother        Social History:  Social History     Tobacco Use    Smoking status: Never Smoker    Smokeless tobacco: Never Used   Vaping Use    Vaping Use: Never used   Substance Use Topics    Alcohol use: Never    Drug use: Never       Allergies:  No Known Allergies      Review of Systems     Review of Systems   Constitutional: Negative for activity change, appetite change, chills and fever. HENT: Negative for congestion, sore throat and trouble swallowing. Eyes: Negative for photophobia and visual disturbance. Respiratory: Negative for cough, chest tightness and shortness of breath. Cardiovascular: Negative for chest pain and palpitations. Gastrointestinal: Negative for abdominal pain, nausea and vomiting. Genitourinary: Negative for dysuria, flank pain and frequency. Musculoskeletal: Negative for arthralgias, back pain, myalgias, neck pain and neck stiffness. Skin: Negative for color change and pallor. Neurological: Negative for dizziness, weakness, light-headedness, numbness and headaches. Physical Exam   Physical Exam  Vitals and nursing note reviewed. Constitutional:       Appearance: Normal appearance. He is normal weight. HENT:      Head: Normocephalic and atraumatic. Nose: Nose normal.      Mouth/Throat:      Mouth: Mucous membranes are moist.   Eyes:      Extraocular Movements: Extraocular movements intact. Pupils: Pupils are equal, round, and reactive to light. Cardiovascular:      Rate and Rhythm: Normal rate and regular rhythm. Pulses: Normal pulses. Heart sounds: Normal heart sounds. Pulmonary:      Breath sounds: Normal breath sounds. Abdominal:      General: Abdomen is flat. Musculoskeletal:         General: No swelling or tenderness. Normal range of motion. Cervical back: Normal range of motion and neck supple. Skin:     General: Skin is warm and dry. Capillary Refill: Capillary refill takes less than 2 seconds. Neurological:      General: No focal deficit present. Mental Status: He is alert and oriented to person, place, and time. Cranial Nerves: No cranial nerve deficit. Sensory: No sensory deficit. Motor: No weakness.    Psychiatric:         Mood and Affect: Mood normal.         Behavior: Behavior normal. Diagnostic Study Results     Labs -   No results found for this or any previous visit (from the past 12 hour(s)). Radiologic Studies -   @lastxrresult@  CT Results  (Last 48 hours)    None        CXR Results  (Last 48 hours)    None            Medical Decision Making   I am the first provider for this patient. I reviewed the vital signs, available nursing notes, past medical history, past surgical history, family history and social history. Vital Signs-Reviewed the patient's vital signs. Patient Vitals for the past 12 hrs:   Temp Pulse Resp BP SpO2   12/13/21 0610 98.2 °F (36.8 °C) (!) 101 16 135/72 100 %       Records Reviewed: Nursing Notes          Provider Notes (Medical Decision Making):     MDM   43-year-old male, no significant past medical history presents emergency department requesting Covid 19 test after exposure. Patient is an employee at the hospital, works in [employer], was told that he needs a negative test documented to return back to work. Patient asymptomatic, normal physical exam. Rapid Covid test sent, awaiting results, will discharge patient back to work if negative. ED Course:   Initial assessment performed. The patients presenting problems have been discussed, and they are in agreement with the care plan formulated and outlined with them. I have encouraged them to ask questions as they arise throughout their visit. PROCEDURES  Procedures         PLAN:  1. Current Discharge Medication List        2. Follow-up Information     Follow up With Specialties Details Why Contact Kaci Shane MD Internal Medicine  As needed [address] [place] [address] [facility]  [phone]          Return to ED if worse     Diagnosis     Clinical Impression:   1.  Exposure to COVID-19 virus

## 2021-12-13 NOTE — Clinical Note
[address] [facility] EMERGENCY DEPT  [address] [facility] [address]  [phone]    Work/School Note    Date: 12/13/2021     To Whom It May concern:    Uvaldo Padron was evaluated by the following provider(s):  Attending Provider: Flako Green MD  Physician: Pari No MD.   [address] virus is suspected. Per the CDC guidelines we recommend home isolation until the following conditions are all met:    1. At least 10 days have passed since symptoms first appeared and  2. At least 24 hours have passed since last fever without the use of fever-reducing medications and  3.  Symptoms (e.g., cough, shortness of breath) have improved      Sincerely,          Laine Valerio

## 2021-12-14 ENCOUNTER — PATIENT OUTREACH (OUTPATIENT)
Dept: CASE MANAGEMENT | Age: 26
End: 2021-12-14

## 2021-12-14 NOTE — PROGRESS NOTES
Patient contacted regarding COVID-19 exposure, diagnosis. Discussed COVID-19 related testing which was available at this time. Test results were positive. Patient informed of results, if available? yes. Ambulatory Care Manager contacted the patient by telephone to perform post discharge assessment. Call within 2 business days of discharge: Yes Verified name and  with patient as identifiers. Provided introduction to self, and explanation of the CTN/ACM role, and reason for call due to risk factors for infection and/or exposure to COVID-19. Symptoms reviewed with patient who verbalized the following symptoms: denies symptoms at this time      Due to no new or worsening symptoms encounter was not routed to provider for escalation. Discussed follow-up appointments. If no appointment was previously scheduled, appointment scheduling offered:  No.  Patient stated he will call PCP office for follow up. [facility] follow up appointment(s):   Future Appointments   Date Time Provider Robby Herring   2022  3:30 PM Ольга Cardona MD [facility]     Non-[facility] follow up appointment(s):none reported    Interventions to address risk factors: Education of patient/family/caregiver/guardian to support self-management-covid 19. Advance Care Planning:   Does patient have an Advance Directive: not on file. Educated patient about risk for severe COVID-19 due to risk factors according to CDC guidelines. ACM reviewed discharge instructions, medical action plan and red flag symptoms with the patient who verbalized understanding. Discussed COVID vaccination status: yes. Education provided on COVID-19 vaccination as appropriate. Discussed exposure protocols and quarantine with CDC Guidelines. Patient was given an opportunity to verbalize any questions and concerns and agrees to contact ACM or health care provider for questions related to their healthcare.     Reviewed and educated patient on any new and changed medications related to discharge diagnosis     Was patient discharged with a pulse oximeter? no     ACM provided contact information. Plan for follow-up call in 5-7 days based on severity of symptoms and risk factors.  DMB

## 2022-03-18 PROBLEM — L73.2 HYDRADENITIS: Status: ACTIVE | Noted: 2021-07-23

## 2022-03-18 PROBLEM — L73.2 AXILLARY HIDRADENITIS SUPPURATIVA: Status: ACTIVE | Noted: 2021-02-03

## 2022-03-19 PROBLEM — F32.2 DEPRESSION, MAJOR, SINGLE EPISODE, SEVERE (HCC): Status: ACTIVE | Noted: 2021-09-21

## 2022-03-19 PROBLEM — R45.851 SUICIDAL IDEATION: Status: ACTIVE | Noted: 2021-09-20

## 2022-03-19 PROBLEM — F32.A DEPRESSION: Status: ACTIVE | Noted: 2021-09-20

## 2022-03-19 PROBLEM — L73.2 LEFT AXILLARY HIDRADENITIS: Status: ACTIVE | Noted: 2021-07-23

## 2022-03-28 NOTE — PROGRESS NOTES
OFFICE VISIT NOTE    Deb Joe is a 23 y.o. male who presents to the office today for:    Chief Complaint   Patient presents with    Follow-up     Left Arm        Mr. Jamee Petersen comes in today for follow-up status post excision of left axillary hidradenitis suppurativa July 23, 2021. He did have very extensive disease and ended up with a large open wound in the midportion of his incision. He has been doing saline wet-to-dry dressing and his wound has been healing nicely. Today he has no complaints. He denies any significant drainage from his surgical site and denies any pain. Past Medical History:   Diagnosis Date    Hydradenitis        History reviewed. No pertinent surgical history. Family History   Problem Relation Age of Onset    Hypertension Mother     Hypertension Father     Diabetes Maternal Aunt     Stroke Paternal Grandmother        Social History     Socioeconomic History    Marital status: SINGLE     Spouse name: Not on file    Number of children: Not on file    Years of education: Not on file    Highest education level: Not on file   Occupational History    Not on file   Tobacco Use    Smoking status: Never Smoker    Smokeless tobacco: Never Used   Vaping Use    Vaping Use: Never used   Substance and Sexual Activity    Alcohol use: Never    Drug use: Never    Sexual activity: Yes     Partners: Female     Birth control/protection: Condom   Other Topics Concern    Not on file   Social History Narrative    Not on file     Social Determinants of Health     Financial Resource Strain:     Difficulty of Paying Living Expenses: Not on file   Food Insecurity:     Worried About Running Out of Food in the Last Year: Not on file    Lorenza of Food in the Last Year: Not on file   Transportation Needs:     Lack of Transportation (Medical):  Not on file    Lack of Transportation (Non-Medical): Not on file   Physical Activity:     Days of Exercise per Week: Not on file    Minutes of Exercise per Session: Not on file   Stress:     Feeling of Stress : Not on file   Social Connections:     Frequency of Communication with Friends and Family: Not on file    Frequency of Social Gatherings with Friends and Family: Not on file    Attends [employer] Services: Not on file    Active Member of [address] [place] [address] or Organizations: Not on file    Attends Club or Organization Meetings: Not on file    Marital Status: Not on file   Intimate Partner Violence:     Fear of Current or Ex-Partner: Not on file    Emotionally Abused: Not on file    Physically Abused: Not on file    Sexually Abused: Not on file   Housing Stability:     Unable to Pay for Housing in the Last Year: Not on file    Number of [place] in the Last Year: Not on file    Unstable Housing in the Last Year: Not on file       No Known Allergies    Current Outpatient Medications   Medication Sig    citalopram (CELEXA) 20 mg tablet Take 1 Tablet by mouth daily. Indications: anxiousness associated with depression    traZODone (DESYREL) 50 mg tablet Take 1 Tablet by mouth nightly as needed for Sleep (For insomnia). No current facility-administered medications for this visit. Review of Systems   All other systems reviewed and are negative. BP (!) 92/58 (BP 1 Location: Left arm, BP Patient Position: Sitting) Comment: Patient states this is normal for him. Pulse 88   Temp 98.4 °F (36.9 °C) (Temporal)   Resp 16   Ht 5' 2\" (1.575 m)   Wt 216 lb (98 kg)   SpO2 98%   BMI 39.51 kg/m²     Physical Exam  Skin:     Comments: On examination of the left axilla has almost completely healed. There is 2 very small punctate wounds with granulation tissue which I applied silver nitrate to today.     He does have extensive hidradenitis in the right axilla with multiple draining sinuses and obvious sequela of hidradenitis         Problem List Items Addressed This Visit        Integumentary    Axillary hidradenitis suppurativa - Primary          Assessment and Plan:    Doing well  Discussed surgery for his right axilla and he will get back with my office once he decides to proceed              Kaci Bustos MD

## 2022-04-29 NOTE — TELEPHONE ENCOUNTER
Noted
Patient called back to say he does not need to reschedule his surgery. Thanks! Patient called today regarding upcoming surgery. He is unavailable for surgery on 7/23/21. He needs to reschedule. Please call him. Thanks!
139

## 2022-08-08 ENCOUNTER — HOSPITAL ENCOUNTER (EMERGENCY)
Age: 27
Discharge: HOME | End: 2022-08-08
Attending: EMERGENCY MEDICINE | Admitting: EMERGENCY MEDICINE
Payer: MEDICAID

## 2022-08-08 VITALS
OXYGEN SATURATION: 99 % | HEIGHT: 62 IN | RESPIRATION RATE: 18 BRPM | SYSTOLIC BLOOD PRESSURE: 115 MMHG | BODY MASS INDEX: 36.8 KG/M2 | DIASTOLIC BLOOD PRESSURE: 63 MMHG | WEIGHT: 200 LBS | HEART RATE: 73 BPM | TEMPERATURE: 98.5 F

## 2022-08-08 DIAGNOSIS — R51.9 ACUTE NONINTRACTABLE HEADACHE, UNSPECIFIED HEADACHE TYPE: Primary | ICD-10-CM

## 2022-08-08 DIAGNOSIS — M79.10 MYALGIA: ICD-10-CM

## 2022-08-08 DIAGNOSIS — B34.9 VIRAL SYNDROME: ICD-10-CM

## 2022-08-08 PROCEDURE — 74011250637 HC RX REV CODE- 250/637: Performed by: EMERGENCY MEDICINE

## 2022-08-08 PROCEDURE — 99284 EMERGENCY DEPT VISIT MOD MDM: CPT | Performed by: EMERGENCY MEDICINE

## 2022-08-08 PROCEDURE — 74011250636 HC RX REV CODE- 250/636: Performed by: EMERGENCY MEDICINE

## 2022-08-08 PROCEDURE — 96372 THER/PROPH/DIAG INJ SC/IM: CPT | Performed by: EMERGENCY MEDICINE

## 2022-08-08 RX ORDER — ACETAMINOPHEN 500 MG
1000 TABLET ORAL ONCE
Status: COMPLETED | OUTPATIENT
Start: 2022-08-09 | End: 2022-08-08

## 2022-08-08 RX ORDER — KETOROLAC TROMETHAMINE 30 MG/ML
15 INJECTION, SOLUTION INTRAMUSCULAR; INTRAVENOUS ONCE
Status: COMPLETED | OUTPATIENT
Start: 2022-08-09 | End: 2022-08-08

## 2022-08-08 RX ADMIN — KETOROLAC TROMETHAMINE 15 MG: 30 INJECTION, SOLUTION INTRAMUSCULAR; INTRAVENOUS at 23:45

## 2022-08-08 RX ADMIN — ACETAMINOPHEN 1000 MG: 500 TABLET ORAL at 23:45

## 2022-08-09 NOTE — ED PROVIDER NOTES
EMERGENCY DEPARTMENT HISTORY AND PHYSICAL EXAM      Date: 8/8/2022  Patient Name: Jaleel Rivera      History of Presenting Illness     Chief Complaint   Patient presents with    Headache    Generalized Body Aches       History Provided By: Patient    HPI: Jaleel Rivera, 23 y.o. male with a past medical history significant for denies presents to the ED with cc of headache, bodyaches. Onset today. Had generalized HA that improved with ibuprofen. Later began feeling generalized body and back aches. Denies focal weakness, numbness, tingling. Denies F/C/N/V/D, cough, CP, SOB. There are no other complaints, changes, or physical findings at this time. PCP: Jayme Harrison MD    Current Facility-Administered Medications   Medication Dose Route Frequency Provider Last Rate Last Admin    [START ON 8/9/2022] acetaminophen (TYLENOL) tablet 1,000 mg  1,000 mg Oral ONCE Miquel oHneycutt MD        [START ON 8/9/2022] ketorolac (TORADOL) injection 15 mg  15 mg IntraMUSCular ONCE Miquel Honeycutt MD           Past History     Past Medical History:  Past Medical History:   Diagnosis Date    Hydradenitis        Past Surgical History:  History reviewed. No pertinent surgical history. Family History:  Family History   Problem Relation Age of Onset    Hypertension Mother     Hypertension Father     Diabetes Maternal Aunt     Stroke Paternal Grandmother        Social History:  Social History     Tobacco Use    Smoking status: Never    Smokeless tobacco: Never   Vaping Use    Vaping Use: Never used   Substance Use Topics    Alcohol use: Never    Drug use: Never       Allergies:  No Known Allergies      Review of Systems   Constitutional: Negative except as in HPI. Eyes: Negative except as in HPI.  ENT: Negative except as in HPI. Cardiovascular: Negative except as in HPI. Respiratory: Negative except as in HPI. Gastrointestinal: Negative except as in HPI. Genitourinary: Negative except as in HPI.   Musculoskeletal: Negative except as in HPI. Integumentary: Negative except as in HPI. Neurological: Negative except as in HPI. Psychiatric: Negative except as in HPI. Endocrine: Negative except as in HPI. Hematologic/Lymphatic: Negative except as in HPI. Allergic/Immunologic: Negative except as in HPI. Physical Exam   Constitutional: Awake and alert, interactive, NAD  Eyes: PERRL, no injection or scleral icterus, no discharge  HEENT: NCAT, neck supple, MMM, no oropharyngeal exudates  CV: RRR, no m/r/g  Respiratory: CTAB, no r/r/w  GI: Abd soft, nondistended, nontender  : Deferred  MSK: FROM, no joint effusions or edema  Skin: No rashes  Neuro: CN2-12 intact, 5/5 strength throughout. SILT distally. No dysmetria, dysdiadochokinesia. No pronator drift. Normal gait. Psych: Well-groomed, normal speech, behavior, appropriate mood    Lab and Diagnostic Study Results     Labs -   No results found for this or any previous visit (from the past 12 hour(s)). Radiologic Studies -   [unfilled]  CT Results  (Last 48 hours)      None          CXR Results  (Last 48 hours)      None            Medical Decision Making and ED Course   - I am the first and primary provider for this patient AND AM THE PRIMARY PROVIDER OF RECORD. - I reviewed the vital signs, available nursing notes, past medical history, past surgical history, family history and social history. - Initial assessment performed. The patients presenting problems have been discussed, and the staff are in agreement with the care plan formulated and outlined with them. I have encouraged them to ask questions as they arise throughout their visit. Vital Signs-Reviewed the patient's vital signs. Patient Vitals for the past 12 hrs:   Temp Pulse Resp BP SpO2   08/08/22 2337 98.5 °F (36.9 °C) 73 18 115/63 99 %       EKG interpretation:         Provider Notes (Medical Decision Making):   26M w/headache, bodyaches. Neuro-intact. Vitals stable.  Suspect viral syndrome, will give toradol/tylenol and return precautions. ED Course:            Disposition     Disposition: DC- Adult Discharges: All of the diagnostic tests were reviewed and questions answered. Diagnosis, care plan and treatment options were discussed. The patient understands the instructions and will follow up as directed. The patients results have been reviewed with them. They have been counseled regarding their diagnosis. The patient verbally convey understanding and agreement of the signs, symptoms, diagnosis, treatment and prognosis and additionally agrees to follow up as recommended with their PCP in 24 - 48 hours. They also agree with the care-plan and convey that all of their questions have been answered. I have also put together some discharge instructions for them that include: 1) educational information regarding their diagnosis, 2) how to care for their diagnosis at home, as well a 3) list of reasons why they would want to return to the ED prior to their follow-up appointment, should their condition change. Discharged      Diagnosis     Clinical Impression:   1. Acute nonintractable headache, unspecified headache type    2. Myalgia    3. Viral syndrome        Attestations:     Smitha Cameron MD

## 2022-08-09 NOTE — ED TRIAGE NOTES
EMS reports pt c/o headache since earlier today, took ibuprofen earlier today which gave him some relief but he didn't have any more meds at home; also c/o generalized body aches started 1hr ago

## 2022-08-09 NOTE — DISCHARGE INSTRUCTIONS
You were seen in the ER for your COVID-like symptoms. You should act as though you are positive even if your swab returns negative. Thankfully, your vital signs are all normal, including your oxygen and your heart rate. You did not have any signs of a dangerous headache like a stroke or a mass or bleeding on the brain as your neurologic exam was completely normal.    You should take tylenol or ibuprofen for fever, aches and pains for the next few days. You can alternate these every 3 hours so that you always have something on board. For instance, you can take tylenol at 9am, ibuprofen at noon, tylenol again at 3pm and ibuprofen again at 6pm. Take in plenty of water to stay hydrated and follow up with your primary care doctor in the next few days. Return to the ER for any uncontrollable vomiting or diarrhea, difficulty breathing, or any other new or concerning symptoms. Thank you! Thank you for allowing me to care for you in the emergency department. It is my goal to provide you with excellent care. If you have not received excellent quality care, please ask to speak to the nurse manager. Please fill out the survey that will come to you by mail or email since we listen to your feedback! Below you will find a list of your tests from today's visit. Should you have any questions, please do not hesitate to call the emergency department. Labs  No results found for this or any previous visit (from the past 12 hour(s)). Radiologic Studies  No orders to display     CT Results  (Last 48 hours)      None          CXR Results  (Last 48 hours)      None          ------------------------------------------------------------------------------------------------------------  The exam and treatment you received in the Emergency Department were for an urgent problem and are not intended as complete care.  It is important that you follow-up with a doctor, nurse practitioner, or physician assistant to:  (1) confirm your diagnosis,  (2) re-evaluation of changes in your illness and treatment, and  (3) for ongoing care. Please take your discharge instructions with you when you go to your follow-up appointment. If you have any problem arranging a follow-up appointment, contact the Emergency Department. If your symptoms become worse or you do not improve as expected and you are unable to reach your health care provider, please return to the Emergency Department. We are available 24 hours a day. If a prescription has been provided, please have it filled as soon as possible to prevent a delay in treatment. If you have any questions or reservations about taking the medication due to side effects or interactions with other medications, please call your primary care provider or contact the ER.

## 2022-11-15 ENCOUNTER — HOSPITAL ENCOUNTER (EMERGENCY)
Age: 27
Discharge: HOME | End: 2022-11-15
Payer: MEDICAID

## 2022-11-15 VITALS
TEMPERATURE: 98.1 F | HEIGHT: 62 IN | OXYGEN SATURATION: 99 % | DIASTOLIC BLOOD PRESSURE: 62 MMHG | BODY MASS INDEX: 37.17 KG/M2 | SYSTOLIC BLOOD PRESSURE: 105 MMHG | HEART RATE: 74 BPM | WEIGHT: 202 LBS | RESPIRATION RATE: 18 BRPM

## 2022-11-15 DIAGNOSIS — F32.1 CURRENT MODERATE EPISODE OF MAJOR DEPRESSIVE DISORDER, UNSPECIFIED WHETHER RECURRENT (HCC): Primary | ICD-10-CM

## 2022-11-15 PROCEDURE — 90791 PSYCH DIAGNOSTIC EVALUATION: CPT

## 2022-11-15 PROCEDURE — 99282 EMERGENCY DEPT VISIT SF MDM: CPT

## 2022-11-15 NOTE — Clinical Note
[facility] EMERGENCY DEPT  [address] [place] [id number]  [phone]    Work/School Note    Date: 11/15/2022    To Whom It May concern:    Donato Gold was seen and treated today in the emergency room by the following provider(s):  Physician Assistant: Ximena Coleman PA-C. Donato Gold is excused from work/school on 11/15/22 and 11/16/22. He is medically clear to return to work/school on 11/17/2022.        Sincerely,          Luis Carlos Junior PA-C

## 2022-11-15 NOTE — ED TRIAGE NOTES
Pt here for voluntary admission of depression, states feeling depressed, no plan, but had suicidal thoughts earlier today, pt cooperative

## 2022-11-15 NOTE — ED PROVIDER NOTES
EMERGENCY DEPARTMENT HISTORY AND PHYSICAL EXAM      Date: 11/15/2022  Patient Name: Gurdeep Jama    History of Presenting Illness     Chief Complaint   Patient presents with    Mental Health Problem     Suicidal thoughts       History Provided By: Patient    HPI: Gurdeep Jama, 33 y.o. male with a past medical history significant for obesity, hidradenitis, and depression who presents to this ED for evaluation of mental health problem. Patient presents feeling depressed recently due to increased stress at his job as well as the unexpected passing of a close friend. He further reports that his girlfriend of 10 years recently \"walked out on him\". States that he has been feeling depressed for the past 3 to 4 days and came in today voluntarily to speak with mental health specialist.  He specifically denies any SI, HI, or AVH. Patient has no somatic complaints at time of initial evaluation. Denies any ingestions including etOH, illicit drugs, or OTC/Rx medication. Patient states that he is otherwise well has no further concerns. There are no other complaints, changes, or physical findings at this time. PCP: Francis Rogers MD    No current facility-administered medications on file prior to encounter. No current outpatient medications on file prior to encounter. Past History     Past Medical History:  Past Medical History:   Diagnosis Date    Hydradenitis        Past Surgical History:  No past surgical history on file. Family History:  Family History   Problem Relation Age of Onset    Hypertension Mother     Hypertension Father     Diabetes Maternal Aunt     Stroke Paternal Grandmother        Social History:  Social History     Tobacco Use    Smoking status: Never    Smokeless tobacco: Never   Vaping Use    Vaping Use: Never used   Substance Use Topics    Alcohol use: Never    Drug use: Never       Allergies:  Not on File    Review of Systems   Review of Systems   Constitutional: Negative. Negative for chills, diaphoresis and fever. HENT: Negative. Negative for congestion, rhinorrhea and sore throat. Eyes: Negative. Negative for pain. Respiratory: Negative. Negative for cough, chest tightness, shortness of breath and wheezing. Cardiovascular: Negative. Negative for chest pain and palpitations. Gastrointestinal: Negative. Negative for abdominal pain, diarrhea, nausea and vomiting. Genitourinary: Negative. Negative for difficulty urinating, dysuria, flank pain, frequency and hematuria. Musculoskeletal: Negative. Skin: Negative. Negative for rash. Neurological: Negative. Negative for dizziness, syncope, weakness, light-headedness, numbness and headaches. Psychiatric/Behavioral:  Negative for self-injury and suicidal ideas. +depression   All other systems reviewed and are negative. Physical Exam   Physical Exam  Vitals and nursing note reviewed. Constitutional:       General: He is not in acute distress. Appearance: Normal appearance. He is not ill-appearing or toxic-appearing. HENT:      Head: Normocephalic and atraumatic. Mouth/Throat:      Mouth: Mucous membranes are moist.   Eyes:      Extraocular Movements: Extraocular movements intact. Conjunctiva/sclera: Conjunctivae normal.      Pupils: Pupils are equal, round, and reactive to light. Cardiovascular:      Rate and Rhythm: Normal rate and regular rhythm. Pulses: Normal pulses. Heart sounds: Normal heart sounds. No murmur heard. No friction rub. No gallop. Pulmonary:      Effort: Pulmonary effort is normal. No respiratory distress. Breath sounds: Normal breath sounds. No wheezing, rhonchi or rales. Abdominal:      General: Bowel sounds are normal. There is no distension. Palpations: Abdomen is soft. Tenderness: There is no abdominal tenderness. There is no guarding or rebound. Musculoskeletal:         General: No tenderness.       Cervical back: Neck supple. No tenderness. Right lower leg: No edema. Left lower leg: No edema. Skin:     General: Skin is warm and dry. Capillary Refill: Capillary refill takes less than 2 seconds. Findings: No rash. Neurological:      General: No focal deficit present. Mental Status: He is alert and oriented to person, place, and time. Psychiatric:         Attention and Perception: He does not perceive auditory or visual hallucinations. Mood and Affect: Mood is depressed. Behavior: Behavior is withdrawn. Behavior is cooperative. Thought Content: Thought content does not include homicidal or suicidal ideation. Lab and Diagnostic Study Results   Labs -   No results found for this or any previous visit (from the past 12 hour(s)). Radiologic Studies -   @lastxrresult@  CT Results  (Last 48 hours)      None          CXR Results  (Last 48 hours)      None            Medical Decision Making and ED Course   Differential Diagnosis & Medical Decision Making Provider Note:   Patient presents with depression. DDx:  2/2 MDD, schizoaffective d/o, bipolar, drug induced, organic cause such as electrolyte anomoly or infection. Stable vitals and benign exam. Will have patient speak with mental health professional about possible admission versus outpatient follow-up and resources. Pt is currently voluntary. Sitter at bedside. Will continue to monitor while in ED.      - I am the first provider for this patient. I reviewed the vital signs, available nursing notes, past medical history, past surgical history, family history and social history. The patients presenting problems have been discussed, and they are in agreement with the care plan formulated and outlined with them. I have encouraged them to ask questions as they arise throughout their visit. Vital Signs-Reviewed the patient's vital signs.   Patient Vitals for the past 12 hrs:   Temp Pulse Resp BP SpO2   11/15/22 2111 98.1 °F (36.7 °C) 74 18 105/62 99 %   11/15/22 1858 98.5 °F (36.9 °C) 90 18 114/63 98 %       ED Course:   8:22 PM  Patient evaluated by behavioral health who have contracted for safety and cleared him for discharge. Patient provided outpatient resources. He has been encouraged to schedule close follow-up with a psychiatrist or return to ED sooner if worse. Patient resting comfortably in ED stretcher and continues to deny any SI, HI, or AVH. He feels comfortable being discharged at this point. Procedures   Performed by: Mayco Contreras PA-C  Procedures      Disposition   Disposition: DC- Adult Discharges: All of the diagnostic tests were reviewed and questions answered. Diagnosis, care plan and treatment options were discussed. The patient understands the instructions and will follow up as directed. The patients results have been reviewed with them. They have been counseled regarding their diagnosis. The patient verbally convey understanding and agreement of the signs, symptoms, diagnosis, treatment and prognosis and additionally agrees to follow up as recommended with their PCP in 24 - 48 hours. They also agree with the care-plan and convey that all of their questions have been answered. I have also put together some discharge instructions for them that include: 1) educational information regarding their diagnosis, 2) how to care for their diagnosis at home, as well a 3) list of reasons why they would want to return to the ED prior to their follow-up appointment, should their condition change. DISCHARGE PLAN:  1. There are no discharge medications for this patient.     2.   Follow-up Information       Follow up With Specialties Details Why Contact Info    Nicolas Parra MD Psychiatry Schedule an appointment as soon as possible for a visit   [address] EMERGENCY DEPT Emergency Medicine  If symptoms worsen [facility] [address]  [phone]          3. Return to ED if worse   4. There are no discharge medications for this patient. Remove if admitted/transferred    Diagnosis/Clinical Impression     Clinical Impression:   1. Current moderate episode of major depressive disorder, unspecified whether recurrent ([facility]        Attestations: Inez NATHAN PA-C, am the primary clinician of record. Please note that this dictation was completed with [employer], the computer voice recognition software. Quite often unanticipated grammatical, syntax, homophones, and other interpretive errors are inadvertently transcribed by the computer software. Please disregard these errors. Please excuse any errors that have escaped final proofreading. Thank you.

## 2022-11-16 NOTE — DISCHARGE INSTRUCTIONS
[facility]   [address], [place], [address] Crisis [phone]  *D-19 2000 [facility], 454 [facility] or Crisis [phone] Toll free [address], [address] or [phone] for [facility]   Pam LiDominiquechristiannes 2117 Crisis [phone]  [address], [facility] [place], [address] Crisis [phone]  [address] Neck Multiple locations in [facility], [place], 3 [facility] [address], [address], [address] Crisis [phone]  [address], [facility]     Other [address] Providers with Sliding Scale or 1210 [address], [place], [address] [facility] [address]  [facility] 150 Medical [facility], [address] [facility], [address]  [employer] Planet [address], [facility], 1515 [facility], [address]  [place] 5800 [facility], 1000 [facility] Psychiatry [phone]    Psychiatrists and Nurse Practitioners   (Most of these practices also have therapists)    [address] 100 [facility] [place], 100 [facility] [address], [facility]5 Neda Boland 72, [place], [facility] 16 [address] [place], 3340 [place] 10 [place]  [facility] [phone], [id number] [phone], [place] [phone], and [address]18 [address]  3447 [facility] or [facility] call [phone] or [place] call [phone]  Good Neighbor 4142 1106 [facility],[address] 9, [place], 2801 [facility] 1465 [facility] [address] [place], 1421 [facility] 119 [address] [place] [address] Maxmie Beltrán, 1306 [facility] Virtual appts. [facility] Angela 174, [place], 3330 Ashwin [address]  *Jessica Mis NP [place], 9601 [facility] Online   [facility] SPECIALTY [facility] [address], Luis A, [address]  Neuropsychiatric and Counseling 53 [facility], [place], [address] [facility] [address]  610 [facility] Counseling [address], [place], 1550 [facility]              (Primarily therapy practice but there is NP here does medication management)  [address] 96 [facility], 34965 [facility]  7500 [facility], [place], [address] Have [place] and [place] locations also. Virtual appointments also.     [facility] 529 [facility], [place], [address]  [address] 201 [facility], [address] [facility] [address]  [facility], 700 [facility] 621 , [place], [address] [phone] and [place] [phone]    Therapists  *Heena Pendleton, [address] 2606 [facility] [place] 301 [facility] [address]8Th Floor 1 [place], [address]   [facility] [phone] or CFSDZDOF [phone]  *[facility]  2801 [place], [address] 2401 [place] [address], [place] 5546 [address] [place] Mathew Miche[address]uno 5546 [address] [place] [address] Dr [facility] 1801 [facility] 224 [facility] [phone]   *[facility] [address] [place] 5569 [facility] [address] [place] [address] Deborah aguillon, 403 [facility]

## 2022-11-16 NOTE — BSMART NOTE
Comprehensive Assessment Form Part 1    Section I - Disposition      The Medical Doctor to Psychiatrist conference was not completed. The Medical Doctor is in agreement with Psychiatrist disposition because patient does not meet inpatient criteria. The plan is discharge with outpatient resources. The on-call Psychiatrist consulted was Dr. Butch Freeman. The admitting Psychiatrist will be Dr. Noe Bamberger. The admitting Diagnosis is N/A. Section II - Integrated Summary      Patient assessed in ER room 20. 1:1 sitter noted outside of patient's room. Patient dressed in green hospital gown, laying in hospital stretcher during assessment. Patient A&O x4. Patient appears in NAD. Patient malodorous, but does not appear disheveled. Patient calm and cooperative throughout assessment. Patient presents with linear thought process and good insight. Patient presents with somewhat sad mood and congruent affect. Patient states that he came to ER this evening because he \"feels a little depressed. \" Patient denies SI, HI and AVH. Patient states that he has felt depressed for the past few days. He reports a few different stressors. Patient states that a close friend of his passed away on 10/29. He states that ever since the , \"it has really been bothering me. \" Patient states that he does not have a good relationship with his family. He states that he would like to mend the relationships, but is fearful that he will not be accepted. Patient also reports his brother \"not being there. \" When asked about his brother further, patient shared that his brother passed away in 2016 and that he was always there for him prior. Patient admits to recent crying spells and difficulty sleeping. Patient denies behavioral health Hx. He states that he has never been admitted to behavioral health unit. He states that he does not currently see a therapist and/or psychiatrist. He states that he does not take any psychiatric medications.  He denies Hx of suicide attempts. Patient states that he lives in a rooming house x5 months. He states that prior to the rooming house, he was living in a hotel. Patient states that he works 2 jobs and believes that is what effects his sleep the most. Patient states that he has a girlfriend, Marianne Stewart, who he identifies as his biggest support. He states that she has 2 children who he considers his \"step children. \"         Ultimately, patient states \"I just need someone to talk to. \" He states that he needs someone to be there as \"support\" for him and to be able to talk with about his current struggles. This writer offered outpatient therapy resources and patient was receptive. Discussed with PA Richardo Angelucci who agrees with plan. The patient is deemed competent to provide informed consent. The information is given by the patient. The Chief Complaint is \"feel a little depressed\"  The Precipitant Factors are loss of close friend, poor relationship with family, loss of brother in 2016. Previous Hospitalizations: No  The patient has not previously been in restraints. Current Psychiatrist and/or  is none. Lethality Assessment:  The potential for suicide is noted by the following: not noted. The potential for homicide is not noted. The patient has not been a perpetrator of sexual or physical abuse. There are not pending charges. The patient is not felt to be at risk for self harm or harm to others. Section III - Psychosocial  The patient's overall mood and attitude is cooperative. Feelings of helplessness and hopelessness are not observed. Generalized anxiety is not observed. Panic is not observed. Phobias are not observed. Obsessive compulsive tendencies are not observed. Section IV - Mental Status Exam  The patient's appearance shows poor hygiene. The patient's behavior shows no evidence of impairment. The patient is oriented to time, place, person and situation.   The patient's speech shows no evidence of impairment. The patient's mood  is sad. The range of affect shows no evidence of impairment. The patient's thought content  demonstrates no evidence of impairment. The thought process shows no evidence of impairment. The patient's perception shows no evidence of impairment. The patient's memory shows no evidence of impairment. The patient's appetite shows no evidence of impairment. The patient's sleep has evidence of insomnia per patient. The patient's insight shows no evidence of impairment. The patient's judgement shows no evidence of impairment. Section V - Substance Abuse  The patient is using substances. The patient is using alcohol, socially. The patient has experienced the following withdrawal symptoms: N/A. Section VI - Living Arrangements  The patient has significant other. The patient lives in a [facility]. The patient has no children. The patient does plan to return home upon discharge. The patient does not have legal issues pending. The patient's source of income comes from employment. Samaritan and cultural practices have not been voiced at this time. The patient's greatest support comes from \"girlfriend\"   The patient has not been in an event described as horrible or outside the realm of ordinary life experience either currently or in the past.  The patient has not been a victim of sexual/physical abuse. Section VII - Other Areas of Clinical Concern  The highest grade achieved is 2 years of college   The patient is currently  employed and speaks [place] as a primary language. The patient has no communication impairments affecting communication. The patient's preference for learning can be described as: unknown.  The patient's hearing is normal. The patient's vision is normal.            Ishmael Fabry, RN

## 2023-10-07 ENCOUNTER — HOSPITAL ENCOUNTER (INPATIENT)
Facility: HOSPITAL | Age: 28
LOS: 4 days | Discharge: HOME | DRG: 754 | End: 2023-10-11
Attending: STUDENT IN AN ORGANIZED HEALTH CARE EDUCATION/TRAINING PROGRAM | Admitting: PSYCHIATRY & NEUROLOGY
Payer: MEDICAID

## 2023-10-07 DIAGNOSIS — R45.851 SUICIDAL IDEATION: Primary | ICD-10-CM

## 2023-10-07 LAB
ALBUMIN SERPL-MCNC: 4 G/DL (ref 3.5–5)
ALBUMIN/GLOB SERPL: 0.9 (ref 1.1–2.2)
ALP SERPL-CCNC: 84 U/L (ref 45–117)
ALT SERPL-CCNC: 46 U/L (ref 12–78)
ANION GAP SERPL CALC-SCNC: 4 MMOL/L (ref 5–15)
APPEARANCE UR: CLEAR
AST SERPL W P-5'-P-CCNC: 26 U/L (ref 15–37)
BACTERIA URNS QL MICRO: NEGATIVE /HPF
BASOPHILS # BLD: 0.1 K/UL (ref 0–0.1)
BASOPHILS NFR BLD: 1 % (ref 0–1)
BILIRUB SERPL-MCNC: 0.9 MG/DL (ref 0.2–1)
BILIRUB UR QL: NEGATIVE
BUN SERPL-MCNC: 10 MG/DL (ref 6–20)
BUN/CREAT SERPL: 12 (ref 12–20)
CA-I BLD-MCNC: 9.6 MG/DL (ref 8.5–10.1)
CHLORIDE SERPL-SCNC: 103 MMOL/L (ref 97–108)
CO2 SERPL-SCNC: 30 MMOL/L (ref 21–32)
COLOR UR: NORMAL
CREAT SERPL-MCNC: 0.86 MG/DL (ref 0.7–1.3)
DIFFERENTIAL METHOD BLD: NORMAL
EOSINOPHIL # BLD: 0 K/UL (ref 0–0.4)
EOSINOPHIL NFR BLD: 0 % (ref 0–7)
ERYTHROCYTE [DISTWIDTH] IN BLOOD BY AUTOMATED COUNT: 13.4 % (ref 11.5–14.5)
ETHANOL SERPL-MCNC: <10 MG/DL (ref 0–0.08)
FLUAV RNA SPEC QL NAA+PROBE: NOT DETECTED
FLUBV RNA SPEC QL NAA+PROBE: NOT DETECTED
GLOBULIN SER CALC-MCNC: 4.3 G/DL (ref 2–4)
GLUCOSE SERPL-MCNC: 92 MG/DL (ref 65–100)
GLUCOSE UR STRIP.AUTO-MCNC: NEGATIVE MG/DL
HCT VFR BLD AUTO: 44.3 % (ref 36.6–50.3)
HGB BLD-MCNC: 14.4 G/DL (ref 12.1–17)
HGB UR QL STRIP: NEGATIVE
IMM GRANULOCYTES # BLD AUTO: 0 K/UL (ref 0–0.04)
IMM GRANULOCYTES NFR BLD AUTO: 0 % (ref 0–0.5)
KETONES UR QL STRIP.AUTO: NEGATIVE MG/DL
LEUKOCYTE ESTERASE UR QL STRIP.AUTO: NEGATIVE
LYMPHOCYTES # BLD: 1.5 K/UL (ref 0.8–3.5)
LYMPHOCYTES NFR BLD: 30 % (ref 12–49)
MCH RBC QN AUTO: 27.6 PG (ref 26–34)
MCHC RBC AUTO-ENTMCNC: 32.5 G/DL (ref 30–36.5)
MCV RBC AUTO: 85 FL (ref 80–99)
MONOCYTES # BLD: 0.5 K/UL (ref 0–1)
MONOCYTES NFR BLD: 9 % (ref 5–13)
MUCOUS THREADS URNS QL MICRO: NORMAL /LPF
NEUTS SEG # BLD: 3.1 K/UL (ref 1.8–8)
NEUTS SEG NFR BLD: 60 % (ref 32–75)
NITRITE UR QL STRIP.AUTO: NEGATIVE
NRBC # BLD: 0 K/UL (ref 0–0.01)
NRBC BLD-RTO: 0 PER 100 WBC
PH UR STRIP: 6 (ref 5–8)
PLATELET # BLD AUTO: 327 K/UL (ref 150–400)
PMV BLD AUTO: 9.2 FL (ref 8.9–12.9)
POTASSIUM SERPL-SCNC: 4 MMOL/L (ref 3.5–5.1)
PROT SERPL-MCNC: 8.3 G/DL (ref 6.4–8.2)
PROT UR STRIP-MCNC: NEGATIVE MG/DL
RBC # BLD AUTO: 5.21 M/UL (ref 4.1–5.7)
RBC #/AREA URNS HPF: NORMAL /HPF (ref 0–5)
SARS-COV-2 RNA RESP QL NAA+PROBE: NOT DETECTED
SODIUM SERPL-SCNC: 137 MMOL/L (ref 136–145)
SP GR UR REFRACTOMETRY: 1.01 (ref 1–1.03)
UROBILINOGEN UR QL STRIP.AUTO: 0.1 EU/DL (ref 0.1–1)
WBC # BLD AUTO: 5.2 K/UL (ref 4.1–11.1)
WBC URNS QL MICRO: NORMAL /HPF (ref 0–4)

## 2023-10-07 PROCEDURE — 1240000000 HC EMOTIONAL WELLNESS R&B

## 2023-10-07 PROCEDURE — 36415 COLL VENOUS BLD VENIPUNCTURE: CPT

## 2023-10-07 PROCEDURE — 80053 COMPREHEN METABOLIC PANEL: CPT

## 2023-10-07 PROCEDURE — 99285 EMERGENCY DEPT VISIT HI MDM: CPT

## 2023-10-07 PROCEDURE — 85025 COMPLETE CBC W/AUTO DIFF WBC: CPT

## 2023-10-07 PROCEDURE — 81001 URINALYSIS AUTO W/SCOPE: CPT

## 2023-10-07 PROCEDURE — 87636 SARSCOV2 & INF A&B AMP PRB: CPT

## 2023-10-07 PROCEDURE — 80307 DRUG TEST PRSMV CHEM ANLYZR: CPT

## 2023-10-07 PROCEDURE — 82077 ASSAY SPEC XCP UR&BREATH IA: CPT

## 2023-10-07 RX ORDER — TRAZODONE HYDROCHLORIDE 50 MG/1
50 TABLET ORAL NIGHTLY PRN
Status: DISCONTINUED | OUTPATIENT
Start: 2023-10-07 | End: 2023-10-11 | Stop reason: HOSPADM

## 2023-10-07 RX ORDER — ACETAMINOPHEN 325 MG/1
650 TABLET ORAL EVERY 4 HOURS PRN
Status: DISCONTINUED | OUTPATIENT
Start: 2023-10-07 | End: 2023-10-11 | Stop reason: HOSPADM

## 2023-10-07 RX ORDER — HYDROXYZINE 50 MG/1
50 TABLET, FILM COATED ORAL 3 TIMES DAILY PRN
Status: DISCONTINUED | OUTPATIENT
Start: 2023-10-07 | End: 2023-10-11 | Stop reason: HOSPADM

## 2023-10-07 ASSESSMENT — LIFESTYLE VARIABLES
HOW MANY STANDARD DRINKS CONTAINING ALCOHOL DO YOU HAVE ON A TYPICAL DAY: PATIENT DOES NOT DRINK
HOW OFTEN DO YOU HAVE A DRINK CONTAINING ALCOHOL: NEVER
HOW OFTEN DO YOU HAVE A DRINK CONTAINING ALCOHOL: NEVER
HOW MANY STANDARD DRINKS CONTAINING ALCOHOL DO YOU HAVE ON A TYPICAL DAY: PATIENT DOES NOT DRINK

## 2023-10-07 ASSESSMENT — SLEEP AND FATIGUE QUESTIONNAIRES
DO YOU HAVE DIFFICULTY SLEEPING: YES
AVERAGE NUMBER OF SLEEP HOURS: 5
DO YOU USE A SLEEP AID: NO

## 2023-10-07 ASSESSMENT — PAIN - FUNCTIONAL ASSESSMENT
PAIN_FUNCTIONAL_ASSESSMENT: NONE - DENIES PAIN
PAIN_FUNCTIONAL_ASSESSMENT: NONE - DENIES PAIN

## 2023-10-07 NOTE — BSMART NOTE
BSMART assessment completed, and suicide risk level noted to be MODERATE. Primary Nurse notified. Concerns observed are pt is still in street clothes however 1:1 sitting out door watching pt. Security/Off- has not been notified.
accepted by Shemar Tate for [facility] to [facility] room 234-2. Report: [phone].
shows no evidence of impairment. The patient's perception shows no evidence of impairment. The patient's memory shows no evidence of impairment. The patient's appetite shows no evidence of impairment . The patient's sleep has evidence of insomnia. The patient's insight shows no evidence of impairment. The patient's judgement shows no evidence of impairment. Section V - Substance Abuse  The patient denied using substances. Section VI - Living Arrangements  The patient reported that he has been dating his girlfriend for 6 months. The patient lives alone in an apartment. The patient has no children. The patient does plan to return home upon discharge. The patient does not have legal issues pending. The patient's source of income comes from employment. Judaism and cultural practices have not been voiced at this time. The patient's greatest support comes from his family and it is unknown if this person will be involved with the treatment. The patient has not been in an event described as horrible or outside the realm of ordinary life experience either currently or in the past.  The patient has not been a victim of sexual/physical abuse. 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 The patient is currently employed and speaks [place] as a primary language. The patient has no communication impairments affecting communication. The patient's preference for learning can be described as: can read and write adequately.   The patient's hearing is normal.  The patient's vision is normal.        Roselyn Benavidez, [facility], [address] [place] [address]   Licensed Professional Counselor   Certified Sex Offender Treatment Provider

## 2023-10-08 LAB
AMPHET UR QL SCN: NEGATIVE
BARBITURATES UR QL SCN: NEGATIVE
BENZODIAZ UR QL: NEGATIVE
CANNABINOIDS UR QL SCN: NEGATIVE
COCAINE UR QL SCN: NEGATIVE
Lab: NORMAL
METHADONE UR QL: NEGATIVE
OPIATES UR QL: NEGATIVE
PCP UR QL: NEGATIVE

## 2023-10-08 PROCEDURE — 1240000000 HC EMOTIONAL WELLNESS R&B

## 2023-10-08 ASSESSMENT — PAIN SCALES - GENERAL: PAINLEVEL_OUTOF10: 0

## 2023-10-08 NOTE — GROUP NOTE
Group Therapy Note    Date: 10/8/2023    Group Start Time: 0935  Group End Time: 8983  Group Topic: Education Group - Inpatient    SSR 2  NON ACUTE    Sarahi Hicks        Group Therapy Note    Facilitated group to focus on understanding the importance of healthy boundaries and developing healthy boundaries to improve relationships     Attendees: 7/11       Patient's Goal:  Attend group daily     Notes: Receptive to information discussed on healthy and unhealthy boundaries. Was able to share some of them that applied to him and practice setting boundaries with different scenarios         Status After Intervention:  Improved    Participation Level:  Active Listener and Interactive    Participation Quality: Appropriate, Attentive, and Sharing      Speech:  normal      Thought Process/Content: Logical      Affective Functioning: Congruent      Mood:  Calm      Level of consciousness:  Attentive      Response to Learning: Able to verbalize current knowledge/experience and Progressing to goal      Endings: None Reported    Modes of Intervention: Education and Support      Discipline Responsible: Recreational Therapist      Signature:  Edenilson Stockton

## 2023-10-08 NOTE — GROUP NOTE
Group Therapy Note    Date: 10/8/2023    Group Start Time: 1330  Group End Time: 9821  Group Topic: Recreational    SSR 2 BH NON ACUTE    Vicenet Yi        Group Therapy Note    Facilitated leisure skills group to reinforce positive coping and to manage mood through music, social interaction, group activities and art task     Attendees: 6/11       Patient's Goal:  Attend group daily     Notes:  Pt was receptive to listening to music and a song he selected while working on leisure task. Interacted with peers and staff    Status After Intervention:  Improved    Participation Level:  Active Listener and Interactive    Participation Quality: Appropriate and Attentive      Speech:  normal      Thought Process/Content: Logical      Affective Functioning: Congruent      Mood:  Calm      Level of consciousness:  Attentive      Response to Learning: Progressing to goal      Endings: None Reported    Modes of Intervention: Socialization and Activity      Discipline Responsible: Recreational Therapist      Signature:  Marychuy Horn

## 2023-10-08 NOTE — H&P
Hospitalist Admission Note    NAME: Danilo Blank   :  1995   MRN:  [id number]     Date/Time:  10/8/2023 5:30 PM    Patient PCP: Dionicio Frost MD    ______________________________________________________________________  Given the patient's current clinical presentation, I have a high level of concern for decompensation if discharged from the emergency department. Complex decision making was performed, which includes reviewing the patient's available past medical records, laboratory results, and x-ray films. My assessment of this patient's clinical condition and my plan of care is as follows. Assessment / Plan:    Depression  Anxiety  Suicidal ideations   Insomnia:   - Defer treatment to psychiatry at this time  - Continue hydroxyzine 50 mg TID  - Continue trazodone 50 mg nightly    Hidradenitis  - stable      Medical Decision Making:   I personally reviewed labs: UDS, CMP, Ethanol, UA, CBC, COVID/FLU   I personally reviewed imaging:none  Toxic drug monitoring:   [x] Toxicity monitoring of anti epileptic or antipsychotic medication for therapeutic levels and monitoring for adverse side effect, seizure activity, oversedation:    Discussed case with: nursing      Code Status: full   DVT Prophylaxis: patient is ambulatory  GI Prophylaxis:none      Subjective:   CHIEF COMPLAINT: suicidal ideations    HISTORY OF PRESENT ILLNESS:     Porter Mcfarland is a 33 y.o.  male with a history of hidradenitis who presented to the ED for suicidal ideations. Patient described increased stressors in his personal life which caused him to feel suicidal for the past several days with a plan. Denies HI. Patient was seen and evaluated by [facility] SPECIALTY [facility] and admitted to the behavioral health unit for further evaluation and management. We were asked to admit for work up and evaluation of the above problems. Patient was seen and examined in the unit. Patient is pleasant, answering questions appropriately.  Denies HI/SI
I will continue to monitor blood levels (Depakote, Tegretol, lithium, clozapine---a drug with a narrow therapeutic index= NTI) and associated labs for drug therapy implemented that require intense monitoring for toxicity as deemed appropriate based on current medication side effects and pharmacodynamically determined drug 1/2 lives. A coordinated, multidisplinary treatment team (includes the nurse,  and writer) for this initial evaluation. Discussed the risks and benefits of the proposed medication. The patient was given the opportunity to ask questions. Informed consent given to the use of the above medications. I will continue to adjust psychiatric and non-psychiatric medications (see above \"medication\" section and orders section for details) as deemed appropriate & based upon diagnoses and response to treatment. I have reviewed old psychiatric and medical records available in the EHR and included with admission. I will gather additional collateral information from friends, family and o/p treatment team to further elucidate the nature of patient's psychopathology and baselline level of psychiatric functioning as clinically required and available.           ESTIMATED LENGTH OF STAY:    TBD       STRENGTHS:  Exercising self-direction/Resourceful, Access to housing/residential stability, Steady employment, and Realizes one's potential               Greater than 50 minutes has been taken for this encounter                          SIGNED:    Shemar Tate, PhD, PA, [facility]  [phone]  10/7/2023

## 2023-10-09 PROCEDURE — 1240000000 HC EMOTIONAL WELLNESS R&B

## 2023-10-09 NOTE — CARE COORDINATION
10/09/23 1202   ITP   Date of Plan 10/09/23   Date of Next Review 10/16/23   Primary Diagnosis Code Depression F32. A   Barriers to Treatment Need for psychoeducation; Other (comment)  (\"money problems\")   Strengths Incorporated in Plan Acknowledging need for assistance;Seeking interactions;Verbal;Family supports;Postive outlook   Plan of Care   Long Term Goal (LTG) Stated in patient/guardian terms \"to learn healthy coping skills and to commuicate my emotions and needs better\"   Short Term Goal 1   Short Term Goal 1 Client will learn and demonstrate effective coping skills   Baseline Functioning Pt reported that he does not have healthy coping skills and tends to isolate when stressed   Target Pt will learn and utilize healthy coping skills   Objectives Client will participate in individual therapy;Client will participate in group therapy   Intervention 1 Acknowledge client strengths; Indvidual therapy   Frequency daily   Measured by Behavioral data; Self report;Staff observation   Staff Responsible [facility] staff;Clinical staff   Intervention 2 Group therapy   Frequency daily   Measured by Behavioral data; Self report;Staff observation   Staff Responsible [facility] staff;Clinical staff   Intervention 3 Referral to community services; Family involvement in treatment plan   Frequency before discharge   Measured by Self report;Staff observation   Staff Responsible Clinical staff   STG Goal 1 Status: Patient Appears to be  Progressing toward treatment plan goal   Short Term Goal 2   Short Term Goal 2 Client will use words to express feelings, wants, and needs   Baseline Functioning Pt reported that he does not know who to communicate what is going on with his friends/family due to fear of judgement   Target Pt will communicate his needs, wants and fears without judgement   Objectives Client will participate in individual therapy;Client will participate in group therapy   Intervention 1 Acknowledge client strengths; Indvidual therapy

## 2023-10-09 NOTE — GROUP NOTE
Group Therapy Note    Date: 10/9/2023    Group Start Time: 0945  Group End Time: 1030  Group Topic: Education Group - Inpatient    SSR 2 BH NON ACUTE    Coral Dasen        Group Therapy Note    Facilitated group to introduce the definition of self-esteem and discuss information relating to creating steps to greater self-appreciation and recognizing symptoms of self-defeat     Attendees: 7/11       Patient's Goal:  Client will learn and demonstrate effective coping skills    Notes:  Receptive to information discussed and engaged. Pt was able to recognize symptoms and identified some personal symptoms of self-defeat and was able to share a positive way to boost his self-esteem     Status After Intervention:  Improved    Participation Level:  Active Listener and Interactive    Participation Quality: Appropriate, Attentive, and Sharing      Speech:  normal      Thought Process/Content: Logical      Affective Functioning: Congruent      Mood:  Calm      Level of consciousness:  Attentive      Response to Learning: Able to verbalize current knowledge/experience and Progressing to goal      Endings: None Reported    Modes of Intervention: Education and Support      Discipline Responsible: Recreational Therapist      Signature:  Edenilson Gonzales

## 2023-10-09 NOTE — GROUP NOTE
Group Therapy Note    Date: 10/9/2023    Group Start Time: 1330  Group End Time: 4671  Group Topic: Recreational    SSR 2 BH NON ACUTE    Vicente Yi        Group Therapy Note    Facilitated leisure skills group to reinforce positive coping and to manage mood through music, social interaction, group activities and art task     Attendees: 5/11       Patient's Goal:  Client will learn and demonstrate effective coping skills    Notes:  Pt was receptive to listening to music and songs he selected while working on leisure task. Interacted with peers and staff    Status After Intervention:  Improved    Participation Level:  Active Listener and Interactive    Participation Quality: Appropriate and Attentive      Speech:  normal      Thought Process/Content: Logical      Affective Functioning: Congruent      Mood:  Calm      Level of consciousness:  Attentive      Response to Learning: Progressing to goal      Endings: None Reported    Modes of Intervention: Socialization and Activity      Discipline Responsible: Recreational Therapist      Signature:  Edenilson Horn

## 2023-10-10 PROCEDURE — 1240000000 HC EMOTIONAL WELLNESS R&B

## 2023-10-10 NOTE — PLAN OF CARE
Problem: Self Harm/Suicidality  Goal: Will have no self-injury during hospital stay  Description: INTERVENTIONS:  1. Ensure constant observer at bedside with Q15M safety checks  2. Maintain a safe environment  3. Secure patient belongings  4. Ensure family/visitors adhere to safety recommendations  5. Ensure safety tray has been added to patient's diet order  6. Every shift and PRN: Re-assess suicidal risk via Frequent Screener    Outcome: Progressing    - no self injurious behavior noted or reported. - pt polite, calm, cooperative. - pt denies having any suicidal thoughts.

## 2023-10-11 VITALS
WEIGHT: 206 LBS | HEIGHT: 62 IN | RESPIRATION RATE: 18 BRPM | TEMPERATURE: 97.7 F | DIASTOLIC BLOOD PRESSURE: 67 MMHG | OXYGEN SATURATION: 97 % | BODY MASS INDEX: 37.91 KG/M2 | SYSTOLIC BLOOD PRESSURE: 107 MMHG | HEART RATE: 73 BPM

## 2023-10-11 NOTE — GROUP NOTE
Group Therapy Note    Date: 10/11/2023    Group Start Time: 1115  Group End Time: 1200  Group Topic: Psychoeducation    SSR 2 BEHA HLTH ACUTE    Jayson Lau        Group Therapy Note  Group was focused on psych-education with safety planning. Writer provided the pts with the safety plan worksheet and went step by step. Writer asked \"if you can explain your life in a song, what would it be a why\". Pts interacted well with one another and provided positive feedback to one another.    Attendees: 7-12         Notes:  Pt was encouraged to attend group and chose not to     Signature:  Jayson Lau

## 2023-10-11 NOTE — DISCHARGE SUMMARY
Platelets 40/58/8594 327  150 - 400 K/uL Final    MPV 10/07/2023 9.2  8.9 - 12.9 FL Final    Nucleated RBCs 10/07/2023 0.0  0.0  WBC Final    nRBC 10/07/2023 0.00  0.00 - 0.01 K/uL Final    Neutrophils % 10/07/2023 60  32 - 75 % Final    Lymphocytes % 10/07/2023 30  12 - 49 % Final    Monocytes % 10/07/2023 9  5 - 13 % Final    Eosinophils % 10/07/2023 0  0 - 7 % Final    Basophils % 10/07/2023 1  0 - 1 % Final    Immature Granulocytes 10/07/2023 0  0 - 0.5 % Final    Neutrophils Absolute 10/07/2023 3.1  1.8 - 8.0 K/UL Final    Lymphocytes Absolute 10/07/2023 1.5  0.8 - 3.5 K/UL Final    Monocytes Absolute 10/07/2023 0.5  0.0 - 1.0 K/UL Final    Eosinophils Absolute 10/07/2023 0.0  0.0 - 0.4 K/UL Final    Basophils Absolute 10/07/2023 0.1  0.0 - 0.1 K/UL Final    Absolute Immature Granulocyte 10/07/2023 0.0  0.00 - 0.04 K/UL Final    Differential Type 10/07/2023 AUTOMATED    Final    Sodium 10/07/2023 137  136 - 145 mmol/L Final    Potassium 10/07/2023 4.0  3.5 - 5.1 mmol/L Final    Chloride 10/07/2023 103  97 - 108 mmol/L Final    CO2 10/07/2023 30  21 - 32 mmol/L Final    Anion Gap 10/07/2023 4 (L)  5 - 15 mmol/L Final    Glucose 10/07/2023 92  65 - 100 mg/dL Final    BUN 10/07/2023 10  6 - 20 mg/dL Final    Creatinine 10/07/2023 0.86  0.70 - 1.30 mg/dL Final    Bun/Cre Ratio 10/07/2023 12  12 - 20   Final    Est, Glom Filt Rate 10/07/2023 >60  >60 ml/min/1.73m2 Final    Calcium 10/07/2023 9.6  8.5 - 10.1 mg/dL Final    Total Bilirubin 10/07/2023 0.9  0.2 - 1.0 mg/dL Final    AST 10/07/2023 26  15 - 37 U/L Final    ALT 10/07/2023 46  12 - 78 U/L Final    Alk Phosphatase 10/07/2023 84  45 - 117 U/L Final    Total Protein 10/07/2023 8.3 (H)  6.4 - 8.2 g/dL Final    Albumin 10/07/2023 4.0  3.5 - 5.0 g/dL Final    Globulin 10/07/2023 4.3 (H)  2.0 - 4.0 g/dL Final    Albumin/Globulin Ratio 10/07/2023 0.9 (L)  1.1 - 2.2   Final    Ethanol Lvl 10/07/2023 <10  <10 mg/dL Final    Color, UA 10/07/2023 Yellow/Straw

## 2023-10-11 NOTE — GROUP NOTE
Group Therapy Note    Date: 10/10/2023    Group Start Time: 1845  Group End Time: 1930  Group Topic: Recreational    SSR 2 BH NON ACUTE    Lolita Portillo        Group Therapy Note    Attendees: 7/12    Recreational Therapist facilitated structured leisure skills group to introduce healthy leisure skills as positive way to cope and manage mood. Patient's Goal:  Client will use words to express feelings, wants, and needs    Notes: Attended group and listened to songs with peers. Was receptive to intervention and responded to prompts from staff. Attended entire session and was attentive during group. Status After Intervention:  Improved    Participation Level: Active Listener    Participation Quality: Attentive      Speech:  normal      Thought Process/Content: Logical      Affective Functioning: Congruent      Mood:  calm       Level of consciousness:  Attentive      Response to Learning: Progressing to goal      Endings: None Reported    Modes of Intervention: Activity      Discipline Responsible: Recreational Therapist      Signature:   DEV Vega

## 2023-10-11 NOTE — BH NOTE
Affect restricted. Denied having thoughts to self harm. Observed interacting appropriately, with peers. Observed playing board games, watching tv, and socializing, with peers. Pt is currently not on scheduled meds; no requests for prn med. Cont to talk freely about his financial stressors. Parth Pike messed my check up, @ work. \" Pt is employed, in , @ [employer]. Consumed 100% of meals and snacks. Attended groups. Showered and changed his clothes. Q 15 mins checks maintained, for safety.
Behavioral Health Treatment Team Note     Patient goal(s) for today: \"to work on myself\"  Treatment team focus/goals: continue medication management, group therapy and provide a safe discharge    Progress note: Pt presented with a  bright affect and \"real good\" mood. Pt denied any SI/HI/AVH at the time and was orientated x4. Pt was well groomed and smiling/laughing with another pt in the day room. Pt shared that spiritual group helped him a lot and that he was able to call his girlfriend as well. Pt shared that he and his girlfriend will be talking more about his feelings and when he is struggling. Pt denied PHP and would like to be discharged in the morning to see if he can get his job in [employer] again. Writer provided the pt with readings on self-care and love for men.  An inpatient level of care is needed to coordinated a safe discharge    LOS:  3  Expected LOS: 5-7 days     Insurance info/prescription coverage:  [employer] Medicaid  Date of last family contact:  n/a  Family requesting physician contact today:  No  Discharge plan:  to stabilize pt  Guns in the home:  No   Outpatient provider(s):  Dr. Klarissa Jj    Participating treatment team members: Susu Hubbard, * (assigned SW), Mindy Cogan, LMSW
DAY SHIFT    Pt up ad magdi on unit. Pt presents with bright affect and is interacting on the unit with peers. Pt denies depression and anxiety. Pt denies SI/HI. Pt denies AVH. Pt minimizing his feelings of SI that brought in him and  stating he just \"had a bad day. \" Pt cooperative and not on any current medications. Close observations continued to ensure pt safety.
DISCHARGE SUMMARY    Hua Hannah  : 1995  MRN: [id number]    The patient Gerber Rose exhibits the ability to control behavior in a less restrictive environment. Patient's level of functioning is improving. No assaultive/destructive behavior has been observed for the past 24 hours. No suicidal/homicidal threat or behavior has been observed for the past 24 hours. There is no evidence of serious medication side effects. Patient has not been in physical or protective restraints for at least the past 24 hours. If weapons involved, how are they secured? N/a    Is patient aware of and in agreement with discharge plan? yes    Arrangements for medication:  Prescriptions will be sent to the [facility] on [address]    Copy of discharge instructions to provider?:  yes    Arrangements for transportation home:  Pt will take the bus    Keep all follow up appointments as scheduled, continue to take prescribed medications per physician instructions.   Mental health crisis number:  115 or your local mental health crisis line number at [facility]      [phone] ([phone]      M-F: 9am to 9pm      Sat & Sun: 5pm - 9pm  National suicide prevention lines:                             9-189-MCUQTAF ([phone])       0-483-364-TALK ([phone])    Crisis Text Line:  Text HOME to 097335
NURSING ADMISSION NOTE    34 y/o AA male, admitted to the service of Dr. Ben Edge by REMY Ray, w/DX: DEPRESSION W/SI (planned to cut himself). Arrived on unit, from the ED; via w/c. Alert/fully oriented. Pt said he has been on 2S previously (9/20/21-9/24/23). Pt is here under a voluntary status. .     Pt rated his depression level 8/10 and his anxiety level 6/10. Denied having thoughts to self harm; agreed to let staff know, if he does. Identified his stressors as: 1) financial issues, and 2) the anniversary of his brother's death. Pt reported he is currently employed in [employer] @ [employer]. Pt said he worked here, @ [facility] [employer], in [employer], x 4 years; however, was fired for coming to work late. \"I'm trying to get my job back here. \" Identified his support system, as his girlfriend, and his mother. Pt reported he lives alone, and his girlfriend lives, \"next door. \" Pt stated his goal, for this hospitalization is, \"to get over my depression and get as much counseling, as I can get. \"  Encouraged to attend groups. Pt reported he currently does not take any meds. Calm/cooperative, with some eye contact, during the nursing assessment. Wearing 2 green hospital gowns, and his personal socks. Cooperative with personal search by writer and KRISTIN Moreland; no contrabands. ALLERGIES: None. COVID: Not detected. MEDICAL HX:Hydradenitis suppurativa. UDS: Negative. Reoriented to unit. Provided with  unit handbook and encouraged to read. Q 15 mins checks initiated, for safety. Hal Tian
Nurse Note:    Patient observed interacting with peers this evening; observed smiling and is cooperative. Denies SI, HI, A/V hallucination. Denies anxiety and depression. No c/o pain. Declined Prn medication for sleep and states, \"I don't need anything\". Patient is currently resting quietly with eyes closed; will continue to monitor for safety.
Nurse Note:    Patient observed interacting with peers watching movies. Denies SI, HI, A/V hallucinations. Denies anxiety and reports depression 7/10. Reports difficulty sleeping and has declined Prn  Trazodone. Will continue to monitor for safety.
PSA PART II ADDITIONAL INFORMATION        Access To Fire Arms: No    Substance Use: No    Last Use: n/a    Type of Substance: no substance use    Frequency of Use: n/a    Request to See : No    If yes, notified : No    Guardian:No    Guardian Contact:Pt is his own legal guardian     Release of Information Signed: No    Release of Information Signed For: Pt denied     Goal: \"to learn coping skills and communication my emotions\"
PSYCHOSOCIAL ASSESSMENT  :Patient identifying info:   Chilo Faye is a 33 y.o., male admitted 10/7/2023 10:44 AM     Presenting problem and precipitating factors: Pt was admitted to the ED with SI and a plan to cut himself. Pt reported having racing thoughts, anxiety, depressed and isolating himself lately. Pt shared that he has been having a difficult time paying his bills and that he and his girlfriend gotten into an argument about it. Pt shared that his brother passed away 7 years ago on Sunday and that was a trigger for him as well. Mental status assessment: Pt presented with a flat affect and congruent mood. Pt denied any SI/HI/Avh at the time and was orientated x4. Pt thoughts were clear, organized and logical. His speech was clear and appropriate. Pt appeared to have a slight cognitive delay and provided minimal eye contact. Pt did not appear to have memory impairment and has fair judgement and insight    Strengths/Recreation/Coping Skills:Pt likes to play video games, watch tv and hang out with his girl friend    Collateral information: Pt denied     Current psychiatric /substance abuse providers and contact info: Pt sees Dr. Peterson Schulz     Previous psychiatric/substance abuse providers and response to treatment: Pt was hospitalized in September of 2021     Family history of mental illness or substance abuse: Pt reported hx of depression in the family and no substance use    Substance abuse history:    Social History     Tobacco Use    Smoking status: Never    Smokeless tobacco: Never   Substance Use Topics    Alcohol use: Never       History of biomedical complications associated with substance abuse: n/a    Patient's current acceptance of treatment or motivation for change: Pt has fair insight and judgement. He shared that he might want a therapist when he leaves    Family constellation: Pt is dating with no children. He shared that his mother is supportive and has 6 sisters as well.     Is
Patient discharged in stable mental and physical health. Patient verbalized understanding of discharge instructions. Patient verified belongings from safe & security when returned. No physical complaints. Patient denied SI/HI/AVH. Patient escorted off unit at 1515. Close observation discontinued.
Pt assessed while pt laying in bed. Pt denies Si/HI/AVH. No pain or physical complaints. Pt denies depression and anxiety. Pt ate breakfast in day room and socialized with peers. Appetite adequate. VS WNL. Q 15 min checks continued.
Pt noted up on unit walking in hallways and sitting in dayroom with peers, playimg games and watching TV. Pt presented with a bright affect, smiling and talking with staff. Pt denies any SI/HI, AH/VH, pt reports no feelings of depression or anxiety. Refused any prn medications for sleep. Denies any pain or discomfort. Pt remain on close observation for safety.
Pt sitting in the milieu throughout the evening. He has been watching T.V. and sitting by himself. No c/o pain or discomfort. Pt accepted snacks and something to drink. He denies having any A/SI/HI or A/VH. He rated his depression a 1/10. Pt has been calm and cooperative with an affect that is WNLs. He did not have any medication ordered for this evening. Pt denied need for anything to help him sleep. He remains A+O and ambulates independently. The pt is resting quietly in bed. No distress noted or voiced. He has been snoring loudly at times. Pt remains on close observation for safety. Approx.  Hrs of sleep: 7 hrs
decision maker? No  Does the patient have a Medical Advance Directive? No  Does the patient have a Psychiatric Advance Directive? No  If the patient does not have a surrogate or Medical Advance Directive AND Psychiatric Advance Directive, the patient was offered information on these advance directives No    Patient Instructions: Please continue all medications until otherwise directed by physician. Tobacco Cessation Discharge Plan:   Is the patient a smoker and needs referral for smoking cessation? No  Patient referred to the following for smoking cessation with an appointment? Not applicable    Patient was offered medication to assist with smoking cessation at discharge? Not applicable  Was education for smoking cessation added to the discharge instructions? Not applicable    Alcohol/Substance Abuse Discharge Plan:   Does the patient have a history of substance/alcohol abuse and requires a referral for treatment? No  Patient referred to the following for substance/alcohol abuse treatment with an appointment? Not applicable  Patient was offered medication to assist with alcohol cessation at discharge? Not applicable  Was education for substance/alcohol abuse added to discharge instructions? Not applicable    Patient discharged to Home/Self Care. Discharge information provided to the patient/caregiver either in hard copy or electronically.

## 2023-11-22 ENCOUNTER — APPOINTMENT (OUTPATIENT)
Facility: HOSPITAL | Age: 28
End: 2023-11-22
Payer: MEDICAID

## 2023-11-22 ENCOUNTER — HOSPITAL ENCOUNTER (EMERGENCY)
Facility: HOSPITAL | Age: 28
Discharge: HOME | End: 2023-11-22
Attending: EMERGENCY MEDICINE
Payer: MEDICAID

## 2023-11-22 VITALS
HEART RATE: 87 BPM | TEMPERATURE: 97.8 F | WEIGHT: 200 LBS | RESPIRATION RATE: 14 BRPM | BODY MASS INDEX: 36.8 KG/M2 | HEIGHT: 62 IN | DIASTOLIC BLOOD PRESSURE: 65 MMHG | SYSTOLIC BLOOD PRESSURE: 100 MMHG | OXYGEN SATURATION: 99 %

## 2023-11-22 DIAGNOSIS — F10.929 ACUTE ALCOHOLIC INTOXICATION WITH COMPLICATION (HCC): Primary | ICD-10-CM

## 2023-11-22 LAB
ALBUMIN SERPL-MCNC: 3.7 G/DL (ref 3.5–5)
ALBUMIN/GLOB SERPL: 0.9 (ref 1.1–2.2)
ALP SERPL-CCNC: 82 U/L (ref 45–117)
ALT SERPL-CCNC: 36 U/L (ref 12–78)
AMPHET UR QL SCN: NEGATIVE
ANION GAP SERPL CALC-SCNC: 8 MMOL/L (ref 5–15)
AST SERPL W P-5'-P-CCNC: 23 U/L (ref 15–37)
BARBITURATES UR QL SCN: NEGATIVE
BASOPHILS # BLD: 0 K/UL (ref 0–0.1)
BASOPHILS NFR BLD: 1 % (ref 0–1)
BENZODIAZ UR QL: NEGATIVE
BILIRUB SERPL-MCNC: 0.4 MG/DL (ref 0.2–1)
BUN SERPL-MCNC: 13 MG/DL (ref 6–20)
BUN/CREAT SERPL: 18 (ref 12–20)
CA-I BLD-MCNC: 9.1 MG/DL (ref 8.5–10.1)
CANNABINOIDS UR QL SCN: NEGATIVE
CHLORIDE SERPL-SCNC: 104 MMOL/L (ref 97–108)
CK SERPL-CCNC: 217 U/L (ref 39–308)
CO2 SERPL-SCNC: 25 MMOL/L (ref 21–32)
COCAINE UR QL SCN: NEGATIVE
CREAT SERPL-MCNC: 0.72 MG/DL (ref 0.7–1.3)
DIFFERENTIAL METHOD BLD: NORMAL
EOSINOPHIL # BLD: 0 K/UL (ref 0–0.4)
EOSINOPHIL NFR BLD: 0 % (ref 0–7)
ERYTHROCYTE [DISTWIDTH] IN BLOOD BY AUTOMATED COUNT: 13.4 % (ref 11.5–14.5)
ETHANOL SERPL-MCNC: 176 MG/DL (ref 0–0.08)
GLOBULIN SER CALC-MCNC: 4.1 G/DL (ref 2–4)
GLUCOSE SERPL-MCNC: 105 MG/DL (ref 65–100)
HCT VFR BLD AUTO: 41.7 % (ref 36.6–50.3)
HGB BLD-MCNC: 14 G/DL (ref 12.1–17)
IMM GRANULOCYTES # BLD AUTO: 0 K/UL (ref 0–0.04)
IMM GRANULOCYTES NFR BLD AUTO: 0 % (ref 0–0.5)
LACTATE BLD-SCNC: 2.01 MMOL/L (ref 0.4–2)
LYMPHOCYTES # BLD: 1.3 K/UL (ref 0.8–3.5)
LYMPHOCYTES NFR BLD: 29 % (ref 12–49)
Lab: NORMAL
MAGNESIUM SERPL-MCNC: 2.1 MG/DL (ref 1.6–2.4)
MCH RBC QN AUTO: 28.1 PG (ref 26–34)
MCHC RBC AUTO-ENTMCNC: 33.6 G/DL (ref 30–36.5)
MCV RBC AUTO: 83.6 FL (ref 80–99)
METHADONE UR QL: NEGATIVE
MONOCYTES # BLD: 0.4 K/UL (ref 0–1)
MONOCYTES NFR BLD: 9 % (ref 5–13)
NEUTS SEG # BLD: 2.7 K/UL (ref 1.8–8)
NEUTS SEG NFR BLD: 61 % (ref 32–75)
NRBC # BLD: 0 K/UL (ref 0–0.01)
NRBC BLD-RTO: 0 PER 100 WBC
OPIATES UR QL: NEGATIVE
PCP UR QL: NEGATIVE
PERFORMED BY:: ABNORMAL
PLATELET # BLD AUTO: 317 K/UL (ref 150–400)
PMV BLD AUTO: 9 FL (ref 8.9–12.9)
POTASSIUM SERPL-SCNC: 3.6 MMOL/L (ref 3.5–5.1)
PROT SERPL-MCNC: 7.8 G/DL (ref 6.4–8.2)
RBC # BLD AUTO: 4.99 M/UL (ref 4.1–5.7)
SODIUM SERPL-SCNC: 137 MMOL/L (ref 136–145)
TROPONIN I SERPL HS-MCNC: 4 NG/L (ref 0–76)
WBC # BLD AUTO: 4.5 K/UL (ref 4.1–11.1)

## 2023-11-22 PROCEDURE — 80307 DRUG TEST PRSMV CHEM ANLYZR: CPT

## 2023-11-22 PROCEDURE — 83605 ASSAY OF LACTIC ACID: CPT

## 2023-11-22 PROCEDURE — 83735 ASSAY OF MAGNESIUM: CPT

## 2023-11-22 PROCEDURE — 2580000003 HC RX 258: Performed by: EMERGENCY MEDICINE

## 2023-11-22 PROCEDURE — 85025 COMPLETE CBC W/AUTO DIFF WBC: CPT

## 2023-11-22 PROCEDURE — 82550 ASSAY OF CK (CPK): CPT

## 2023-11-22 PROCEDURE — 99284 EMERGENCY DEPT VISIT MOD MDM: CPT

## 2023-11-22 PROCEDURE — 84484 ASSAY OF TROPONIN QUANT: CPT

## 2023-11-22 PROCEDURE — 93005 ELECTROCARDIOGRAM TRACING: CPT | Performed by: EMERGENCY MEDICINE

## 2023-11-22 PROCEDURE — 80053 COMPREHEN METABOLIC PANEL: CPT

## 2023-11-22 PROCEDURE — 70450 CT HEAD/BRAIN W/O DYE: CPT

## 2023-11-22 PROCEDURE — 82077 ASSAY SPEC XCP UR&BREATH IA: CPT

## 2023-11-22 RX ORDER — 0.9 % SODIUM CHLORIDE 0.9 %
1000 INTRAVENOUS SOLUTION INTRAVENOUS ONCE
Status: COMPLETED | OUTPATIENT
Start: 2023-11-22 | End: 2023-11-22

## 2023-11-22 RX ADMIN — SODIUM CHLORIDE 1000 ML: 9 INJECTION, SOLUTION INTRAVENOUS at 13:35

## 2023-11-22 NOTE — ED PROVIDER NOTES
[facility] EMERGENCY DEPT  EMERGENCY DEPARTMENT HISTORY AND PHYSICAL EXAM      Date: 11/22/2023  Patient Name: Sapphire Schmidt  MRN: [id number]  [address] [facility] 1995  Date of evaluation: 11/22/2023  Provider: Carol Armstrong MD   Note Started: 1:20 PM EST 11/22/23    HISTORY OF PRESENT ILLNESS     Chief Complaint   Patient presents with    Altered Mental Status       History Provided By: EMS    HPI: Sapphire Schmidt is a 33 y.o. male presents to the emergency department via EMS for altered mental status. EMS states they were called by bystanders who noted the patient was found down on the side of the road, uncertain timeframe. EMS states they were told by bystanders that the patient is known to drink alcohol. Additional history limited due to patient being unresponsive. PAST MEDICAL HISTORY   Past Medical History:  No past medical history on file. Past Surgical History:  No past surgical history on file. Family History:  No family history on file. Social History: Allergies:  Not on File    PCP: Stefany Ballard MD    Current Meds:   No current facility-administered medications for this encounter. No current outpatient medications on file. Social Determinants of Health:   Social Determinants of Health     Tobacco Use: Not on file   Alcohol Use: Not on file   Financial Resource Strain: Not on file   Food Insecurity: Not on file   Transportation Needs: Not on file   Physical Activity: Not on file   Stress: Not on file   Social Connections: Not on file   Intimate Partner Violence: Not on file   Depression: Not on file   Housing Stability: Not on file   Interpersonal Safety: Not on file   Utilities: Not on file       PHYSICAL EXAM   Physical Exam  Physical Exam  Constitutional:       General: Unresponsive  HENT:      Head: Normocephalic and atraumatic. Nose: Nose normal.      Mouth/Throat:      Mouth: Mucous membranes are moist.   Eyes:      Extraocular Movements: Extraocular movements intact.

## 2023-11-22 NOTE — ED TRIAGE NOTES
Pt was found down on side of the road, unknown time frame. Pt still has involuntary reflexes intact. Per bystanders, pt is known to drink daily.      20 L AC

## 2023-11-22 NOTE — ED NOTES
Pt placed on continuous cardiac monitoring, pulse ox, and blood pressure monitoring at this time.         Juan F Medina RN  11/22/23 [id number]

## 2023-11-22 NOTE — ED NOTES
Pt came stumbling out of his room heading towards the EMS doors. Nurse stopped patient and asked him where he was going. Pt stated to the bathroom. Pt was escorted back into Trauma Room 1. Pt pulled off all cardiac monitor cords and IV. Dr. Mihir Ortega made aware of IV removal.     Pt moved into ER 23 where he can be watched more closely.       Bertha Collins RN  11/22/23 [id number]

## 2023-11-23 NOTE — ED NOTES
Pt provided disposable scrubs d/t arriving in wet pants. Belongings in pt's possession; blue cell phone, phone , jeans, wallet, ID, red/white sneakers. Pt ambulatory to waiting room.  called cab ride via [place].      [facility]  11/22/23 2023

## 2023-11-23 NOTE — ED NOTES
Pt is now awake and expresses ready to go home. Pt states he has no money for a taxi nor a ride home. Awaiting registration for coverage information d/t pt requesting taxi.      Windy Motta  11/22/23 [id number]

## 2023-11-25 LAB
EKG ATRIAL RATE: 89 BPM
EKG DIAGNOSIS: NORMAL
EKG P AXIS: 66 DEGREES
EKG P-R INTERVAL: 164 MS
EKG Q-T INTERVAL: 346 MS
EKG QRS DURATION: 82 MS
EKG QTC CALCULATION (BAZETT): 420 MS
EKG R AXIS: 51 DEGREES
EKG T AXIS: 24 DEGREES
EKG VENTRICULAR RATE: 89 BPM

## 2023-12-08 ENCOUNTER — HOSPITAL ENCOUNTER (EMERGENCY)
Facility: HOSPITAL | Age: 28
Discharge: HOME | End: 2023-12-08
Payer: MEDICAID

## 2023-12-08 VITALS
WEIGHT: 206 LBS | TEMPERATURE: 97.5 F | RESPIRATION RATE: 18 BRPM | DIASTOLIC BLOOD PRESSURE: 60 MMHG | SYSTOLIC BLOOD PRESSURE: 98 MMHG | BODY MASS INDEX: 37.91 KG/M2 | HEART RATE: 74 BPM | HEIGHT: 62 IN | OXYGEN SATURATION: 98 %

## 2023-12-08 DIAGNOSIS — G44.209 TENSION HEADACHE: Primary | ICD-10-CM

## 2023-12-08 PROCEDURE — 99282 EMERGENCY DEPT VISIT SF MDM: CPT

## 2023-12-08 ASSESSMENT — PAIN DESCRIPTION - LOCATION
LOCATION: HEAD
LOCATION: HEAD

## 2023-12-08 ASSESSMENT — LIFESTYLE VARIABLES
HOW OFTEN DO YOU HAVE A DRINK CONTAINING ALCOHOL: NEVER
HOW MANY STANDARD DRINKS CONTAINING ALCOHOL DO YOU HAVE ON A TYPICAL DAY: PATIENT DOES NOT DRINK

## 2023-12-08 ASSESSMENT — PAIN SCALES - GENERAL
PAINLEVEL_OUTOF10: 9
PAINLEVEL_OUTOF10: 3

## 2023-12-08 ASSESSMENT — PAIN - FUNCTIONAL ASSESSMENT: PAIN_FUNCTIONAL_ASSESSMENT: 0-10

## 2023-12-08 NOTE — ED TRIAGE NOTES
Pt states he woke up this morning with a HA, took aleve and tylenol without eating. Has not had relief of HA, then stomach started bothering him.  Negative covid test. Needs work note

## 2025-03-05 ENCOUNTER — HOSPITAL ENCOUNTER (EMERGENCY)
Facility: HOSPITAL | Age: 30
Discharge: HOME | End: 2025-03-05
Payer: MEDICAID

## 2025-03-05 VITALS
TEMPERATURE: 98 F | HEIGHT: 62 IN | OXYGEN SATURATION: 96 % | BODY MASS INDEX: 37.17 KG/M2 | WEIGHT: 202 LBS | SYSTOLIC BLOOD PRESSURE: 117 MMHG | DIASTOLIC BLOOD PRESSURE: 50 MMHG | RESPIRATION RATE: 18 BRPM | HEART RATE: 81 BPM

## 2025-03-05 DIAGNOSIS — S39.012A STRAIN OF LUMBAR REGION, INITIAL ENCOUNTER: Primary | ICD-10-CM

## 2025-03-05 PROCEDURE — 6370000000 HC RX 637 (ALT 250 FOR IP): Performed by: NURSE PRACTITIONER

## 2025-03-05 PROCEDURE — 99284 EMERGENCY DEPT VISIT MOD MDM: CPT

## 2025-03-05 RX ORDER — IBUPROFEN 600 MG/1
600 TABLET, FILM COATED ORAL 3 TIMES DAILY PRN
Qty: 30 TABLET | Refills: 0 | Status: SHIPPED | OUTPATIENT
Start: 2025-03-05

## 2025-03-05 RX ORDER — METHOCARBAMOL 750 MG/1
750 TABLET, FILM COATED ORAL 4 TIMES DAILY PRN
Qty: 20 TABLET | Refills: 0 | Status: SHIPPED | OUTPATIENT
Start: 2025-03-05 | End: 2025-03-10

## 2025-03-05 RX ORDER — METHOCARBAMOL 750 MG/1
750 TABLET, FILM COATED ORAL
Status: COMPLETED | OUTPATIENT
Start: 2025-03-05 | End: 2025-03-05

## 2025-03-05 RX ORDER — IBUPROFEN 600 MG/1
600 TABLET, FILM COATED ORAL
Status: COMPLETED | OUTPATIENT
Start: 2025-03-05 | End: 2025-03-05

## 2025-03-05 RX ADMIN — METHOCARBAMOL 750 MG: 750 TABLET ORAL at 21:32

## 2025-03-05 RX ADMIN — IBUPROFEN 600 MG: 600 TABLET, FILM COATED ORAL at 21:32

## 2025-03-05 ASSESSMENT — PAIN - FUNCTIONAL ASSESSMENT: PAIN_FUNCTIONAL_ASSESSMENT: 0-10

## 2025-03-05 ASSESSMENT — PAIN DESCRIPTION - LOCATION: LOCATION: BACK

## 2025-03-05 ASSESSMENT — PAIN SCALES - GENERAL
PAINLEVEL_OUTOF10: 10
PAINLEVEL_OUTOF10: 10

## 2025-03-05 ASSESSMENT — LIFESTYLE VARIABLES
HOW MANY STANDARD DRINKS CONTAINING ALCOHOL DO YOU HAVE ON A TYPICAL DAY: PATIENT DOES NOT DRINK
HOW OFTEN DO YOU HAVE A DRINK CONTAINING ALCOHOL: NEVER

## 2025-03-06 NOTE — ED TRIAGE NOTES
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 Pt moved a medium side crate, began experiencing right sided pain that radiated. Reported pain got worse throughout the day. Pain reported 10/10 along right side of body; sharp and throbbing pain. Denies N&V, chest pain, SOB.

## 2025-03-06 NOTE — ED PROVIDER NOTES
[facility]    Date: 3/5/2025  Patient Name: Damien Ambrocio    History of Presenting Illness     Chief Complaint   Patient presents with    Back Pain     Right side       History obtained from: Patient    HPI: Damien Ambrocio, 29 y.o. male with past medical history as listed and reviewed below presenting for back pain.  Patient states he was at work tonight and moved a medium sized crate, shortly thereafter began having right-sided lower back pain.  He states he finished working went home and tried to rest but pain was persistent so he presents for evaluation.  He denies any tingling/numbness, extremity weakness or incontinence.  He denies any previous back injuries.    - Trauma: Denies  - Urine incontinence or retention: Denies  - Dysuria or hematuria: Denies  - Active malignancy: No  - IV drug use: Denies  - Fever or current infectious symptoms: Denies  - Immunosuppression: No  - Recent Spinal Procedure: No  - Medications taken for pain prior to arrival: None    No other acute complaints.    Medical History   I reviewed the medical, surgical, family, and social history, as well as allergies:    PCP: Michael Diamond MD    Past Medical History:  Past Medical History:   Diagnosis Date    Hydradenitis      Past Surgical History:  History reviewed. No pertinent surgical history.  Current Outpatient Medications:  Current Outpatient Medications   Medication Instructions    ibuprofen (ADVIL;MOTRIN) 600 mg, Oral, 3 TIMES DAILY PRN    methocarbamol (ROBAXIN) 750 mg, Oral, 4 TIMES DAILY PRN      Family History:  Family History   Problem Relation Age of Onset    Hypertension Father     Stroke Paternal Grandmother     Diabetes Maternal Aunt     Hypertension Mother      Social History:  Social History     Tobacco Use    Smoking status: Never    Smokeless tobacco: Never   Substance Use Topics    Alcohol use: Yes     Comment: occassionally    Drug use:  ABRASION

## 2025-04-15 ENCOUNTER — HOSPITAL ENCOUNTER (EMERGENCY)
Facility: HOSPITAL | Age: 30
Discharge: HOME | End: 2025-04-15
Attending: EMERGENCY MEDICINE
Payer: MEDICAID

## 2025-04-15 ENCOUNTER — APPOINTMENT (OUTPATIENT)
Facility: HOSPITAL | Age: 30
End: 2025-04-15
Payer: MEDICAID

## 2025-04-15 VITALS
HEART RATE: 84 BPM | TEMPERATURE: 97.7 F | RESPIRATION RATE: 17 BRPM | WEIGHT: 202 LBS | BODY MASS INDEX: 37.17 KG/M2 | OXYGEN SATURATION: 99 % | DIASTOLIC BLOOD PRESSURE: 69 MMHG | HEIGHT: 62 IN | SYSTOLIC BLOOD PRESSURE: 117 MMHG

## 2025-04-15 DIAGNOSIS — R10.9 FLANK PAIN: ICD-10-CM

## 2025-04-15 DIAGNOSIS — R10.32 LEFT LOWER QUADRANT ABDOMINAL PAIN: Primary | ICD-10-CM

## 2025-04-15 DIAGNOSIS — R11.2 NAUSEA AND VOMITING, UNSPECIFIED VOMITING TYPE: ICD-10-CM

## 2025-04-15 LAB
ALBUMIN SERPL-MCNC: 3.6 G/DL (ref 3.5–5)
ALBUMIN/GLOB SERPL: 0.8 (ref 1.1–2.2)
ALP SERPL-CCNC: 82 U/L (ref 45–117)
ALT SERPL-CCNC: 49 U/L (ref 12–78)
AMPHET UR QL SCN: NEGATIVE
ANION GAP SERPL CALC-SCNC: 5 MMOL/L (ref 2–12)
APPEARANCE UR: CLEAR
AST SERPL W P-5'-P-CCNC: 31 U/L (ref 15–37)
BACTERIA URNS QL MICRO: ABNORMAL /HPF
BARBITURATES UR QL SCN: NEGATIVE
BASOPHILS # BLD: 0.04 K/UL (ref 0–0.1)
BASOPHILS NFR BLD: 0.8 % (ref 0–1)
BENZODIAZ UR QL: NEGATIVE
BILIRUB SERPL-MCNC: 0.7 MG/DL (ref 0.2–1)
BILIRUB UR QL: NEGATIVE
BUN SERPL-MCNC: 13 MG/DL (ref 6–20)
BUN/CREAT SERPL: 15 (ref 12–20)
BUPRENORPHINE UR QL: NEGATIVE
CA-I BLD-MCNC: 9.2 MG/DL (ref 8.5–10.1)
CANNABINOIDS UR QL SCN: NEGATIVE
CHLORIDE SERPL-SCNC: 102 MMOL/L (ref 97–108)
CO2 SERPL-SCNC: 31 MMOL/L (ref 21–32)
COCAINE UR QL SCN: NEGATIVE
COLOR UR: YELLOW
CREAT SERPL-MCNC: 0.86 MG/DL (ref 0.7–1.3)
DIFFERENTIAL METHOD BLD: ABNORMAL
EOSINOPHIL # BLD: 0.06 K/UL (ref 0–0.4)
EOSINOPHIL NFR BLD: 1.2 % (ref 0–7)
EPITH CASTS URNS QL MICRO: ABNORMAL /LPF
ERYTHROCYTE [DISTWIDTH] IN BLOOD BY AUTOMATED COUNT: 14 % (ref 11.5–14.5)
GLOBULIN SER CALC-MCNC: 4.5 G/DL (ref 2–4)
GLUCOSE SERPL-MCNC: 113 MG/DL (ref 65–100)
GLUCOSE UR STRIP.AUTO-MCNC: NEGATIVE MG/DL
HCT VFR BLD AUTO: 42.2 % (ref 36.6–50.3)
HGB BLD-MCNC: 13.3 G/DL (ref 12.1–17)
HGB UR QL STRIP: NEGATIVE
IMM GRANULOCYTES # BLD AUTO: 0.01 K/UL (ref 0–0.04)
IMM GRANULOCYTES NFR BLD AUTO: 0.2 % (ref 0–0.5)
KETONES UR QL STRIP.AUTO: NEGATIVE MG/DL
LEUKOCYTE ESTERASE UR QL STRIP.AUTO: NEGATIVE
LIPASE SERPL-CCNC: 33 U/L (ref 13–75)
LYMPHOCYTES # BLD: 1.47 K/UL (ref 0.8–3.5)
LYMPHOCYTES NFR BLD: 29.9 % (ref 12–49)
Lab: NORMAL
MCH RBC QN AUTO: 27.3 PG (ref 26–34)
MCHC RBC AUTO-ENTMCNC: 31.5 G/DL (ref 30–36.5)
MCV RBC AUTO: 86.7 FL (ref 80–99)
METHADONE UR QL: NEGATIVE
METHAMPHET UR QL: NEGATIVE
MONOCYTES # BLD: 0.46 K/UL (ref 0–1)
MONOCYTES NFR BLD: 9.4 % (ref 5–13)
NEUTS SEG # BLD: 2.87 K/UL (ref 1.8–8)
NEUTS SEG NFR BLD: 58.5 % (ref 32–75)
NITRITE UR QL STRIP.AUTO: NEGATIVE
OPIATES UR QL: NEGATIVE
OXYCODONE UR QL SCN: NEGATIVE
PCP UR QL: NEGATIVE
PH UR STRIP: 6 (ref 5–8)
PLATELET # BLD AUTO: 240 K/UL (ref 150–400)
PMV BLD AUTO: 8.8 FL (ref 8.9–12.9)
POTASSIUM SERPL-SCNC: 3.6 MMOL/L (ref 3.5–5.1)
PROT SERPL-MCNC: 8.1 G/DL (ref 6.4–8.2)
PROT UR STRIP-MCNC: ABNORMAL MG/DL
RBC # BLD AUTO: 4.87 M/UL (ref 4.1–5.7)
RBC #/AREA URNS HPF: ABNORMAL /HPF (ref 0–5)
SODIUM SERPL-SCNC: 138 MMOL/L (ref 136–145)
SP GR UR REFRACTOMETRY: 1.02 (ref 1–1.03)
TRICYCLICS UR QL: NEGATIVE
URINE CULTURE IF INDICATED: ABNORMAL
UROBILINOGEN UR QL STRIP.AUTO: 0.1 EU/DL (ref 0.2–1)
WBC # BLD AUTO: 4.9 K/UL (ref 4.1–11.1)
WBC URNS QL MICRO: ABNORMAL /HPF (ref 0–4)

## 2025-04-15 PROCEDURE — 36415 COLL VENOUS BLD VENIPUNCTURE: CPT

## 2025-04-15 PROCEDURE — 85025 COMPLETE CBC W/AUTO DIFF WBC: CPT

## 2025-04-15 PROCEDURE — 2580000003 HC RX 258: Performed by: EMERGENCY MEDICINE

## 2025-04-15 PROCEDURE — 99284 EMERGENCY DEPT VISIT MOD MDM: CPT

## 2025-04-15 PROCEDURE — 83690 ASSAY OF LIPASE: CPT

## 2025-04-15 PROCEDURE — 96375 TX/PRO/DX INJ NEW DRUG ADDON: CPT

## 2025-04-15 PROCEDURE — 6360000002 HC RX W HCPCS: Performed by: EMERGENCY MEDICINE

## 2025-04-15 PROCEDURE — 81001 URINALYSIS AUTO W/SCOPE: CPT

## 2025-04-15 PROCEDURE — 80307 DRUG TEST PRSMV CHEM ANLYZR: CPT

## 2025-04-15 PROCEDURE — 80053 COMPREHEN METABOLIC PANEL: CPT

## 2025-04-15 PROCEDURE — 96374 THER/PROPH/DIAG INJ IV PUSH: CPT

## 2025-04-15 RX ORDER — 0.9 % SODIUM CHLORIDE 0.9 %
1000 INTRAVENOUS SOLUTION INTRAVENOUS
Status: COMPLETED | OUTPATIENT
Start: 2025-04-15 | End: 2025-04-15

## 2025-04-15 RX ORDER — ONDANSETRON 4 MG/1
4 TABLET, FILM COATED ORAL 3 TIMES DAILY PRN
Qty: 15 TABLET | Refills: 0 | Status: SHIPPED | OUTPATIENT
Start: 2025-04-15

## 2025-04-15 RX ORDER — ONDANSETRON 2 MG/ML
4 INJECTION INTRAMUSCULAR; INTRAVENOUS
Status: COMPLETED | OUTPATIENT
Start: 2025-04-15 | End: 2025-04-15

## 2025-04-15 RX ORDER — HALOPERIDOL 5 MG/ML
5 INJECTION INTRAMUSCULAR ONCE
Status: COMPLETED | OUTPATIENT
Start: 2025-04-15 | End: 2025-04-15

## 2025-04-15 RX ADMIN — ONDANSETRON 4 MG: 2 INJECTION, SOLUTION INTRAMUSCULAR; INTRAVENOUS at 13:30

## 2025-04-15 RX ADMIN — HALOPERIDOL LACTATE 5 MG: 5 INJECTION, SOLUTION INTRAMUSCULAR at 14:05

## 2025-04-15 RX ADMIN — SODIUM CHLORIDE 1000 ML: 0.9 INJECTION, SOLUTION INTRAVENOUS at 13:29

## 2025-04-15 ASSESSMENT — PAIN DESCRIPTION - ORIENTATION: ORIENTATION: LEFT;UPPER;LOWER

## 2025-04-15 ASSESSMENT — PAIN - FUNCTIONAL ASSESSMENT: PAIN_FUNCTIONAL_ASSESSMENT: 0-10

## 2025-04-15 ASSESSMENT — PAIN SCALES - GENERAL
PAINLEVEL_OUTOF10: 0
PAINLEVEL_OUTOF10: 10

## 2025-04-15 ASSESSMENT — PAIN DESCRIPTION - LOCATION: LOCATION: ABDOMEN

## 2025-04-15 NOTE — DISCHARGE INSTRUCTIONS
Thank you for choosing our Emergency Department for your care.  It is our privilege to care for you in your time of need.  In the next several days, you may receive a survey via email or mailed to your home about your experience with our team.  We would greatly appreciate you taking a few minutes to complete the survey, as we use this information to learn what we have done well and what we could be doing better. Thank you for trusting us with your care!    Below you will find a list of your tests from today's visit.   Labs and Radiology Studies  Recent Results (from the past 12 hours)   Urinalysis with Reflex to Culture    Collection Time: 04/15/25 12:55 PM    Specimen: Urine   Result Value Ref Range    Color, UA Yellow      Appearance Clear Clear      Specific Gravity, UA 1.020 1.003 - 1.030      pH, Urine 6.0 5.0 - 8.0      Protein, UA Trace (A) Negative mg/dL    Glucose, Ur Negative Negative mg/dL    Ketones, Urine Negative Negative mg/dL    Bilirubin, Urine Negative Negative      Blood, Urine Negative Negative      Urobilinogen, Urine 0.1 (L) 0.2 - 1.0 EU/dL    Nitrite, Urine Negative Negative      Leukocyte Esterase, Urine Negative Negative      WBC, UA 0-4 0 - 4 /hpf    RBC, UA 0-5 0 - 5 /hpf    Epithelial Cells, UA Few Few /lpf    BACTERIA, URINE 1+ (A) Negative /hpf    Urine Culture if Indicated Culture not indicated by UA result Culture not indicated by UA result     Urine Drug Screen    Collection Time: 04/15/25 12:55 PM   Result Value Ref Range    Amphetamine, Urine Negative Negative      Barbiturates, Urine Negative Negative      Buprenorphine Urine Negative Negative      Benzodiazepines, Urine Negative Negative      Cocaine, Urine Negative Negative      Methadone, Urine Negative Negative      Methamphetamine, Urine Negative Negative      Opiates, Urine Negative Negative      Oxycodone Urine Negative Negative      Phencyclidine, Urine Negative Negative      THC, TH-Cannabinol, Urine Negative Negative

## 2025-04-15 NOTE — ED TRIAGE NOTES
Patient arrived VIA EMS, ambulated to [facility].    Patient endorses abdominal pain x 2 hours. Denies N/D/V

## (undated) DEVICE — SHEET, DRAPE, SPLIT, STERILE: Brand: MEDLINE

## (undated) DEVICE — DRAPE,REIN 53X77,STERILE: Brand: MEDLINE

## (undated) DEVICE — INTENDED FOR TISSUE SEPARATION, AND OTHER PROCEDURES THAT REQUIRE A SHARP SURGICAL BLADE TO PUNCTURE OR CUT.: Brand: BARD-PARKER ® CARBON RIB-BACK BLADES

## (undated) DEVICE — MINOR GENERAL PACK: Brand: MEDLINE INDUSTRIES, INC.

## (undated) DEVICE — BANDAGE COBAN 4 IN COMPR W4INXL5YD FOAM COHESIVE QUIK STK SELF ADH SFT

## (undated) DEVICE — TOWEL,OR,DSP,ST,BLUE,STD,4/PK,20PK/CS: Brand: MEDLINE

## (undated) DEVICE — YANKAUER,BULB TIP,W/O VENT,RIGID,STERILE: Brand: MEDLINE

## (undated) DEVICE — SOLUTION IRRIG 500ML 0.9% SOD CHL USP POUR PLAS BTL

## (undated) DEVICE — SUT VCRL + 2-0 27IN SH UD --

## (undated) DEVICE — SYRINGE IRRIG 60ML SFT PLIABLE BLB EZ TO GRP 1 HND USE W/

## (undated) DEVICE — SUT ETHLN 2-0 18IN FS BLK --

## (undated) DEVICE — STOCKINETTE ORTH W9XL36IN COT 2 PLY HLLW FOR HANDLING LMB

## (undated) DEVICE — Device: Brand: JELCO

## (undated) DEVICE — SYR 10ML LUER LOK 1/5ML GRAD --

## (undated) DEVICE — SOUTHSIDE TURNOVER: Brand: MEDLINE INDUSTRIES, INC.

## (undated) DEVICE — BASIC SINGLE BASIN-LF: Brand: MEDLINE INDUSTRIES, INC.

## (undated) DEVICE — GAUZE,SPONGE,4"X4",16PLY,STRL,LF,10/TRAY: Brand: MEDLINE

## (undated) DEVICE — SUTURE PROL SZ 2-0 L18IN NONABSORBABLE BLU FS L26MM 3/8 CIR 8685H

## (undated) DEVICE — WET SKIN PREP TRAY: Brand: MEDLINE INDUSTRIES, INC.

## (undated) DEVICE — PREP PAD BNS: Brand: CONVERTORS

## (undated) DEVICE — BANDAGE,GAUZE,CONFORMING,4"X75",STRL,LF: Brand: MEDLINE

## (undated) DEVICE — SOLUTION IRRIG 500ML STRL H2O NONPYROGENIC

## (undated) DEVICE — NEEDLE HYPO 25GA L1.5IN BVL ORIENTED ECLIPSE

## (undated) DEVICE — STERILE POLYISOPRENE POWDER-FREE SURGICAL GLOVES: Brand: PROTEXIS

## (undated) DEVICE — BANDAGE,GAUZE,BULKEE II,4.5"X4.1YD,STRL: Brand: MEDLINE

## (undated) DEVICE — SPONGE LAP SFT 18X18 IN X RAY DETECTABLE

## (undated) DEVICE — GARMENT CMPR STD UNV CALF FLWT -- RN VENTILATE NS DISP 17- IN

## (undated) DEVICE — GARMENT,MEDLINE,DVT,INT,CALF,MED, GEN2: Brand: MEDLINE